# Patient Record
Sex: FEMALE | Race: BLACK OR AFRICAN AMERICAN | NOT HISPANIC OR LATINO
[De-identification: names, ages, dates, MRNs, and addresses within clinical notes are randomized per-mention and may not be internally consistent; named-entity substitution may affect disease eponyms.]

---

## 2017-02-14 ENCOUNTER — APPOINTMENT (OUTPATIENT)
Dept: ORTHOPEDIC SURGERY | Facility: CLINIC | Age: 82
End: 2017-02-14

## 2017-02-14 VITALS
HEIGHT: 61.42 IN | DIASTOLIC BLOOD PRESSURE: 70 MMHG | HEART RATE: 65 BPM | WEIGHT: 128 LBS | OXYGEN SATURATION: 90 % | SYSTOLIC BLOOD PRESSURE: 108 MMHG | BODY MASS INDEX: 23.86 KG/M2

## 2017-02-27 ENCOUNTER — OUTPATIENT (OUTPATIENT)
Dept: OUTPATIENT SERVICES | Facility: HOSPITAL | Age: 82
LOS: 1 days | End: 2017-02-27
Payer: COMMERCIAL

## 2017-02-27 PROCEDURE — 96401 CHEMO ANTI-NEOPL SQ/IM: CPT

## 2017-02-27 RX ORDER — DENOSUMAB 60 MG/ML
60 INJECTION SUBCUTANEOUS ONCE
Qty: 0 | Refills: 0 | Status: DISCONTINUED | OUTPATIENT
Start: 2017-02-27 | End: 2017-03-14

## 2017-03-01 DIAGNOSIS — M81.0 AGE-RELATED OSTEOPOROSIS WITHOUT CURRENT PATHOLOGICAL FRACTURE: ICD-10-CM

## 2017-06-15 ENCOUNTER — APPOINTMENT (OUTPATIENT)
Dept: ORTHOPEDIC SURGERY | Facility: CLINIC | Age: 82
End: 2017-06-15

## 2017-06-15 VITALS
DIASTOLIC BLOOD PRESSURE: 60 MMHG | WEIGHT: 128 LBS | HEIGHT: 61.81 IN | BODY MASS INDEX: 23.55 KG/M2 | SYSTOLIC BLOOD PRESSURE: 100 MMHG

## 2017-08-14 ENCOUNTER — APPOINTMENT (OUTPATIENT)
Dept: INFUSION THERAPY | Facility: HOSPITAL | Age: 82
End: 2017-08-14

## 2017-10-24 ENCOUNTER — APPOINTMENT (OUTPATIENT)
Dept: ORTHOPEDIC SURGERY | Facility: CLINIC | Age: 82
End: 2017-10-24
Payer: MEDICARE

## 2017-10-24 ENCOUNTER — APPOINTMENT (OUTPATIENT)
Dept: INFUSION THERAPY | Facility: HOSPITAL | Age: 82
End: 2017-10-24

## 2017-10-24 ENCOUNTER — OUTPATIENT (OUTPATIENT)
Dept: OUTPATIENT SERVICES | Facility: HOSPITAL | Age: 82
LOS: 1 days | End: 2017-10-24
Payer: COMMERCIAL

## 2017-10-24 VITALS
SYSTOLIC BLOOD PRESSURE: 118 MMHG | BODY MASS INDEX: 24.01 KG/M2 | HEIGHT: 61.73 IN | HEART RATE: 69 BPM | WEIGHT: 130.5 LBS | DIASTOLIC BLOOD PRESSURE: 76 MMHG | OXYGEN SATURATION: 93 %

## 2017-10-24 DIAGNOSIS — M81.0 AGE-RELATED OSTEOPOROSIS WITHOUT CURRENT PATHOLOGICAL FRACTURE: ICD-10-CM

## 2017-10-24 PROCEDURE — 96401 CHEMO ANTI-NEOPL SQ/IM: CPT

## 2017-10-24 PROCEDURE — 99215 OFFICE O/P EST HI 40 MIN: CPT

## 2017-10-24 RX ORDER — DENOSUMAB 60 MG/ML
60 INJECTION SUBCUTANEOUS ONCE
Qty: 0 | Refills: 0 | Status: DISCONTINUED | OUTPATIENT
Start: 2017-10-24 | End: 2017-11-10

## 2017-12-05 ENCOUNTER — APPOINTMENT (OUTPATIENT)
Dept: GASTROENTEROLOGY | Facility: CLINIC | Age: 82
End: 2017-12-05
Payer: MEDICARE

## 2017-12-05 VITALS
TEMPERATURE: 98.1 F | WEIGHT: 128 LBS | HEART RATE: 56 BPM | RESPIRATION RATE: 14 BRPM | HEIGHT: 61.73 IN | BODY MASS INDEX: 23.55 KG/M2 | DIASTOLIC BLOOD PRESSURE: 80 MMHG | OXYGEN SATURATION: 97 % | SYSTOLIC BLOOD PRESSURE: 124 MMHG

## 2017-12-05 DIAGNOSIS — N60.02 SOLITARY CYST OF LEFT BREAST: ICD-10-CM

## 2017-12-05 DIAGNOSIS — Z86.79 PERSONAL HISTORY OF OTHER DISEASES OF THE CIRCULATORY SYSTEM: ICD-10-CM

## 2017-12-05 DIAGNOSIS — Z12.11 ENCOUNTER FOR SCREENING FOR MALIGNANT NEOPLASM OF COLON: ICD-10-CM

## 2017-12-05 DIAGNOSIS — Z86.39 PERSONAL HISTORY OF OTHER ENDOCRINE, NUTRITIONAL AND METABOLIC DISEASE: ICD-10-CM

## 2017-12-05 PROCEDURE — 99202 OFFICE O/P NEW SF 15 MIN: CPT

## 2018-02-27 LAB — HEMOCCULT STL QL IA: NEGATIVE

## 2018-04-26 ENCOUNTER — APPOINTMENT (OUTPATIENT)
Dept: ORTHOPEDIC SURGERY | Facility: CLINIC | Age: 83
End: 2018-04-26

## 2018-05-01 ENCOUNTER — APPOINTMENT (OUTPATIENT)
Dept: ORTHOPEDIC SURGERY | Facility: CLINIC | Age: 83
End: 2018-05-01
Payer: MEDICARE

## 2018-05-01 ENCOUNTER — APPOINTMENT (OUTPATIENT)
Dept: INFUSION THERAPY | Facility: HOSPITAL | Age: 83
End: 2018-05-01

## 2018-05-01 ENCOUNTER — OUTPATIENT (OUTPATIENT)
Dept: OUTPATIENT SERVICES | Facility: HOSPITAL | Age: 83
LOS: 1 days | End: 2018-05-01
Payer: COMMERCIAL

## 2018-05-01 VITALS
DIASTOLIC BLOOD PRESSURE: 60 MMHG | WEIGHT: 132 LBS | BODY MASS INDEX: 24.29 KG/M2 | HEART RATE: 71 BPM | SYSTOLIC BLOOD PRESSURE: 100 MMHG | HEIGHT: 61.81 IN

## 2018-05-01 DIAGNOSIS — M81.0 AGE-RELATED OSTEOPOROSIS WITHOUT CURRENT PATHOLOGICAL FRACTURE: ICD-10-CM

## 2018-05-01 PROCEDURE — 96372 THER/PROPH/DIAG INJ SC/IM: CPT

## 2018-05-01 PROCEDURE — 99214 OFFICE O/P EST MOD 30 MIN: CPT

## 2018-05-01 RX ORDER — DENOSUMAB 60 MG/ML
60 INJECTION SUBCUTANEOUS ONCE
Qty: 0 | Refills: 0 | Status: DISCONTINUED | OUTPATIENT
Start: 2018-05-01 | End: 2018-05-16

## 2018-05-08 ENCOUNTER — APPOINTMENT (OUTPATIENT)
Dept: ORTHOPEDIC SURGERY | Facility: CLINIC | Age: 83
End: 2018-05-08

## 2018-06-11 ENCOUNTER — FORM ENCOUNTER (OUTPATIENT)
Age: 83
End: 2018-06-11

## 2018-06-11 ENCOUNTER — OUTPATIENT (OUTPATIENT)
Dept: OUTPATIENT SERVICES | Facility: HOSPITAL | Age: 83
LOS: 1 days | End: 2018-06-11
Payer: COMMERCIAL

## 2018-06-12 PROCEDURE — 78014 THYROID IMAGING W/BLOOD FLOW: CPT

## 2018-06-12 PROCEDURE — 78014 THYROID IMAGING W/BLOOD FLOW: CPT | Mod: 26

## 2018-06-12 PROCEDURE — A9516: CPT

## 2018-06-19 ENCOUNTER — APPOINTMENT (OUTPATIENT)
Dept: ORTHOPEDIC SURGERY | Facility: CLINIC | Age: 83
End: 2018-06-19
Payer: MEDICARE

## 2018-06-19 VITALS
HEART RATE: 76 BPM | HEIGHT: 61.81 IN | BODY MASS INDEX: 23.55 KG/M2 | SYSTOLIC BLOOD PRESSURE: 100 MMHG | DIASTOLIC BLOOD PRESSURE: 60 MMHG | OXYGEN SATURATION: 98 % | WEIGHT: 128 LBS

## 2018-06-19 PROCEDURE — 99214 OFFICE O/P EST MOD 30 MIN: CPT

## 2018-06-27 ENCOUNTER — FORM ENCOUNTER (OUTPATIENT)
Age: 83
End: 2018-06-27

## 2018-06-28 ENCOUNTER — RESULT REVIEW (OUTPATIENT)
Age: 83
End: 2018-06-28

## 2018-06-28 ENCOUNTER — APPOINTMENT (OUTPATIENT)
Dept: ULTRASOUND IMAGING | Facility: HOSPITAL | Age: 83
End: 2018-06-28
Payer: MEDICARE

## 2018-06-28 ENCOUNTER — OUTPATIENT (OUTPATIENT)
Dept: OUTPATIENT SERVICES | Facility: HOSPITAL | Age: 83
LOS: 1 days | End: 2018-06-28
Payer: COMMERCIAL

## 2018-06-28 PROCEDURE — 76942 ECHO GUIDE FOR BIOPSY: CPT | Mod: 26

## 2018-06-28 PROCEDURE — 88173 CYTOPATH EVAL FNA REPORT: CPT

## 2018-06-28 PROCEDURE — 10022: CPT

## 2018-06-28 PROCEDURE — 76942 ECHO GUIDE FOR BIOPSY: CPT

## 2018-06-29 LAB — NON-GYNECOLOGICAL CYTOLOGY STUDY: SIGNIFICANT CHANGE UP

## 2018-08-14 ENCOUNTER — APPOINTMENT (OUTPATIENT)
Dept: ORTHOPEDIC SURGERY | Facility: CLINIC | Age: 83
End: 2018-08-14
Payer: MEDICARE

## 2018-08-14 VITALS
BODY MASS INDEX: 23.37 KG/M2 | OXYGEN SATURATION: 85 % | HEIGHT: 61.81 IN | WEIGHT: 127 LBS | HEART RATE: 188 BPM | DIASTOLIC BLOOD PRESSURE: 60 MMHG | SYSTOLIC BLOOD PRESSURE: 100 MMHG

## 2018-08-14 PROCEDURE — 99214 OFFICE O/P EST MOD 30 MIN: CPT

## 2018-10-23 ENCOUNTER — APPOINTMENT (OUTPATIENT)
Dept: INFUSION THERAPY | Facility: HOSPITAL | Age: 83
End: 2018-10-23

## 2018-11-04 ENCOUNTER — FORM ENCOUNTER (OUTPATIENT)
Age: 83
End: 2018-11-04

## 2018-11-05 ENCOUNTER — OUTPATIENT (OUTPATIENT)
Dept: OUTPATIENT SERVICES | Facility: HOSPITAL | Age: 83
LOS: 1 days | End: 2018-11-05
Payer: COMMERCIAL

## 2018-11-05 ENCOUNTER — APPOINTMENT (OUTPATIENT)
Dept: ULTRASOUND IMAGING | Facility: HOSPITAL | Age: 83
End: 2018-11-05
Payer: MEDICARE

## 2018-11-05 PROCEDURE — 76536 US EXAM OF HEAD AND NECK: CPT | Mod: 26

## 2018-11-05 PROCEDURE — 76536 US EXAM OF HEAD AND NECK: CPT

## 2018-12-04 ENCOUNTER — OUTPATIENT (OUTPATIENT)
Dept: OUTPATIENT SERVICES | Facility: HOSPITAL | Age: 83
LOS: 1 days | End: 2018-12-04
Payer: COMMERCIAL

## 2018-12-04 ENCOUNTER — APPOINTMENT (OUTPATIENT)
Dept: ORTHOPEDIC SURGERY | Facility: CLINIC | Age: 83
End: 2018-12-04
Payer: MEDICARE

## 2018-12-04 ENCOUNTER — APPOINTMENT (OUTPATIENT)
Dept: INFUSION THERAPY | Facility: HOSPITAL | Age: 83
End: 2018-12-04

## 2018-12-04 VITALS
SYSTOLIC BLOOD PRESSURE: 135 MMHG | RESPIRATION RATE: 16 BRPM | DIASTOLIC BLOOD PRESSURE: 65 MMHG | TEMPERATURE: 96 F | HEART RATE: 77 BPM | OXYGEN SATURATION: 100 %

## 2018-12-04 VITALS
OXYGEN SATURATION: 96 % | HEIGHT: 62.2 IN | HEART RATE: 48 BPM | DIASTOLIC BLOOD PRESSURE: 70 MMHG | WEIGHT: 128 LBS | BODY MASS INDEX: 23.26 KG/M2 | SYSTOLIC BLOOD PRESSURE: 100 MMHG

## 2018-12-04 DIAGNOSIS — M81.0 AGE-RELATED OSTEOPOROSIS WITHOUT CURRENT PATHOLOGICAL FRACTURE: ICD-10-CM

## 2018-12-04 PROCEDURE — 99214 OFFICE O/P EST MOD 30 MIN: CPT

## 2018-12-04 PROCEDURE — 96401 CHEMO ANTI-NEOPL SQ/IM: CPT

## 2018-12-04 RX ORDER — DENOSUMAB 60 MG/ML
60 INJECTION SUBCUTANEOUS ONCE
Qty: 0 | Refills: 0 | Status: COMPLETED | OUTPATIENT
Start: 2018-12-04 | End: 2018-12-04

## 2018-12-04 RX ADMIN — DENOSUMAB 60 MILLIGRAM(S): 60 INJECTION SUBCUTANEOUS at 12:01

## 2019-06-04 ENCOUNTER — APPOINTMENT (OUTPATIENT)
Dept: INFUSION THERAPY | Facility: HOSPITAL | Age: 84
End: 2019-06-04

## 2019-11-14 ENCOUNTER — FORM ENCOUNTER (OUTPATIENT)
Age: 84
End: 2019-11-14

## 2019-11-15 ENCOUNTER — APPOINTMENT (OUTPATIENT)
Dept: ULTRASOUND IMAGING | Facility: HOSPITAL | Age: 84
End: 2019-11-15
Payer: MEDICARE

## 2019-11-15 ENCOUNTER — OUTPATIENT (OUTPATIENT)
Dept: OUTPATIENT SERVICES | Facility: HOSPITAL | Age: 84
LOS: 1 days | End: 2019-11-15
Payer: MEDICARE

## 2019-11-15 ENCOUNTER — APPOINTMENT (OUTPATIENT)
Dept: RADIOLOGY | Facility: HOSPITAL | Age: 84
End: 2019-11-15
Payer: MEDICARE

## 2019-11-15 PROCEDURE — 76536 US EXAM OF HEAD AND NECK: CPT | Mod: 26

## 2019-11-15 PROCEDURE — 77080 DXA BONE DENSITY AXIAL: CPT

## 2019-11-15 PROCEDURE — 77080 DXA BONE DENSITY AXIAL: CPT | Mod: 26

## 2019-11-15 PROCEDURE — 76536 US EXAM OF HEAD AND NECK: CPT

## 2019-11-26 ENCOUNTER — APPOINTMENT (OUTPATIENT)
Dept: ORTHOPEDIC SURGERY | Facility: CLINIC | Age: 84
End: 2019-11-26
Payer: MEDICARE

## 2019-11-26 DIAGNOSIS — E04.1 NONTOXIC SINGLE THYROID NODULE: ICD-10-CM

## 2019-11-26 PROCEDURE — 99215 OFFICE O/P EST HI 40 MIN: CPT

## 2019-11-26 NOTE — HISTORY OF PRESENT ILLNESS
[FreeTextEntry1] : This is a 1 year f/u visit for this 85 year old black woman, a retired nurse's assistant, referred by her primary care doctor for her "bones"  back in 2015. I follow her for osteoporosis and for hyperthyroidism. She has toxic MNG, and a left thyroid nodule (cold) 2.4 cm that was biopsied 18 and found benign (see below).\par \par Recent thyroid sonogram had shown nodules and the goal was to distinguish between a toxic autonomous nodule and Graves. See results, below.\par \par Key med hx:\par 1,  There is no history of fracture. \par 2. Patient thinks her tallest height was 5'4" and she has lost 2 inches.\par 3.  There is no parental history of hip fracture. \par 4. The patient is treated for hypertension, atrial fibrillation, and states she has been treated for osteoporosis. \par 5. pt. states she used to take Boniva and Fosamax. She was started on Boniva, she thinks she took this for a few years. She was then switched to Fosamax which she took for a few years.\par 6. It has been a while since patient took oral bisphosphonates.\par 7. Patient got one dose of Reclast in 2013. \par \par Overactive thyroid:\par Patient has been on methimazole, most recently 2.5 mg /day\par The nature of thyroid disease has been unclear\par \par Today, thyroid sonogram done 11/15/19 was rev and compared with: 18 and sonogram 11/15/17: (compared with: 14)\par Report states that size and nodules, particularly the 2.2 cm left nodule are stable.\par right lobe: 4.8 x 1.8 x 1.5 cm\par Left lobe: 4.9 x 2.1 x 2.3 cm\par right lobe has 2 nodules larger than 1 cm:  1.1 x 0.8 x 1 cm that had been smaller 3.5 years ago; and 1 x 0.6 x 0.9 cm that had been smaller as well.\par left lobe has one dominant nodule 2.4 x 2 x 1.9 cm ; it had been 2.1  x 1.8 x 1.9 cm 3.5 years ago \par \par Nuclear scan done 18 of JACKSON scan and uptake:\par uptake at 24 hours: 51.8%\par while there was greater uptake on the left side of thyroid (where nodule is) than right, there was uptake on the right side as well. Hence, the left sided nodule is not a TAN and needed a biopsy.\par \par results of FNA  done 18 :\par FNA of L nodule, 2.4 cm: benign goiter with Hurthle cell metaplasia\par Prior lab evaluation done:\par 18 showed negative anti thyroglobulin and negative anti peroxidase antibodies\par 18: negative thyroid stimulating immunoglobulins\par Graves, or autoimmune thyroid disease is less likely.\par Patient has been well controlled with regard to thyroid, on methimazole 2.5 mg/day.\par \par With regard to osteoporosis management, the patient  got her first injection of prolia in Sept 15, 2016 and tolerated it well.\par \par Prolia #1: 2016\par Prolia #2: 2017\par Prolia #3: 10/24/17\par Prolia #4:  (18)\par Prolia #5 Dec 2018\par skipped spring dose of spring 2019\par \par Today, DXA done 11/15/19 was rev and compared with: DXA 11/15/17 Ronald. 2015: this is on one year of Prolia\par T-scores: -3.6, -2.5, -1.6 and -2.3 at the LS, L FN, L TH and L 1/3 radius\par T-scores: -3.9, -2.1, -1.4 at the L1-L3, L FN, and L TH, improved at LS, FN and TH\par T-scores of -4.7, -2.4, -1.7 and -2.4 at the spine, L FN, L TH and L 1/3 radius respectively.\par \par Today, labs done 19 were rev and compared with: 18 , 18 (on methimazole 2.5 mg/day) compared with: 10/14/17 , 17,  17 (dose of 2.5 mg/day) and 16 on methimazole 5 mg:\par TSH: 1.97, prior: 0.97, 2.24, 0.04, 0.04, 1.05 (2.5 mg/day) , prior  3.94 (5 mg/day), 2.97, prior (on 10 mg methimazole) 4.13 (0.27-4.20)\par Free T4: 1.2,  1.3, 1.2, 1.3, 1.6, prior 1.2, prior 1.3, prior 1.2, prior 1.1\par Free T3:   not done, 3.57 (1.8-4.6), prior  2.96, prior 3.28 (1.8-4.6)\par 25 D: 38.8, 43.6, 41.9, 44.6, 52.8,  58.2\par BSAP: 7.0, 5.5, 6.3, 7.0 (2017), 6.0, prior 7.4\par CTX: 376,  186,  59, 246 (2017)\par creat: 1.16\par PTH/ca: 81/9.9\par \par other labs: \par glucose: 75, 99\par creat: 1.25, 1.42\par PTH/ca: 78/9.9; alb: 4.6;   80/10.0\par anti thyroglobulin: neg\par anti peroxidase: neg\par \par 18\par thyroid stimulating immunoglobulin is negative\par \par calcium intake:  one caltrate a day, has been taking 3/day will reduce to 1 pill BID\par vitamin D: 2000 IU/day\par \par Medications:\par methimazole 5 mg tablet,  5 mg/day\par other meds:simvastatin 40 mg; lisinopril 40 mg; metoprolol; \par \par calcium:  takes one pill BID\par vitamin D: takes one BID\par \par Mother:  68, CHF\par Father: , older,  of old age

## 2019-11-26 NOTE — ASSESSMENT
[FreeTextEntry1] : 84 year old black woman who has history of hyperthyroidism and with recent workup of increased uptake on JACKSON scan (51.8%) but diffuse uptake that does not localize to the 2.4 cm nodule on the left, and with negative thyroid antibodies, pt's hyperthyroidism is likely on the basis of toxic MNG. \par Because nodule is not a "hot nodule" FNA was done of nodule and it is benign. This can be followed for change in size with sonogram in 6 months.\par The patient has been well controlled with normal TFT's on very low dose methimazole 2.5 mg daily and now TFTs appear well controlled. Of note, pt feels fine.\par With regard to severe osteoporosis at the lumbar spine and osteopenia of hip and radius, there is past Rx with oral bisphosphonates and one dose of IV Reclast in December 2013. Updated DXA after 2.5 years  of Prolia does show some improvement. Patient received her 5th dose of prolia Dec 2018 and skipped the Spring of 2019; bone markers are still in the low end of normal, but she can now received Prolia #6. \par \par Plan:\par 1. continue methimazole 2.5 mg/day\par 2. Prolia dose #6 to be ordered; labs in spring of 2020 , visit thereafter\par 3. f/u thyroid sonogram  in November 2020\par

## 2019-11-26 NOTE — REASON FOR VISIT
[Follow-Up: _____] : a [unfilled] follow-up visit [FreeTextEntry1] :  hyperthyroidism and thyroid nodule; osteoporosis

## 2019-12-06 ENCOUNTER — APPOINTMENT (OUTPATIENT)
Age: 84
End: 2019-12-06

## 2019-12-20 ENCOUNTER — OUTPATIENT (OUTPATIENT)
Dept: OUTPATIENT SERVICES | Facility: HOSPITAL | Age: 84
LOS: 1 days | End: 2019-12-20
Payer: MEDICARE

## 2019-12-20 ENCOUNTER — APPOINTMENT (OUTPATIENT)
Age: 84
End: 2019-12-20

## 2019-12-20 VITALS
TEMPERATURE: 99 F | OXYGEN SATURATION: 96 % | SYSTOLIC BLOOD PRESSURE: 133 MMHG | DIASTOLIC BLOOD PRESSURE: 79 MMHG | RESPIRATION RATE: 18 BRPM | HEART RATE: 80 BPM

## 2019-12-20 DIAGNOSIS — M81.0 AGE-RELATED OSTEOPOROSIS WITHOUT CURRENT PATHOLOGICAL FRACTURE: ICD-10-CM

## 2019-12-20 PROCEDURE — 96372 THER/PROPH/DIAG INJ SC/IM: CPT

## 2019-12-20 RX ORDER — DENOSUMAB 60 MG/ML
60 INJECTION SUBCUTANEOUS ONCE
Refills: 0 | Status: COMPLETED | OUTPATIENT
Start: 2019-12-20 | End: 2019-12-20

## 2019-12-20 RX ADMIN — DENOSUMAB 60 MILLIGRAM(S): 60 INJECTION SUBCUTANEOUS at 14:25

## 2020-06-19 ENCOUNTER — APPOINTMENT (OUTPATIENT)
Dept: ENDOCRINOLOGY | Facility: CLINIC | Age: 85
End: 2020-06-19
Payer: MEDICARE

## 2020-06-19 VITALS
SYSTOLIC BLOOD PRESSURE: 110 MMHG | HEIGHT: 63 IN | BODY MASS INDEX: 21.97 KG/M2 | HEART RATE: 70 BPM | WEIGHT: 124 LBS | DIASTOLIC BLOOD PRESSURE: 64 MMHG

## 2020-06-19 PROCEDURE — 99215 OFFICE O/P EST HI 40 MIN: CPT | Mod: 25

## 2020-06-19 PROCEDURE — 36415 COLL VENOUS BLD VENIPUNCTURE: CPT

## 2020-06-19 RX ORDER — DENOSUMAB 60 MG/ML
60 INJECTION SUBCUTANEOUS
Qty: 1 | Refills: 1 | Status: DISCONTINUED | COMMUNITY
Start: 2017-02-21 | End: 2020-06-19

## 2020-06-26 LAB
25(OH)D3 SERPL-MCNC: 42.3 NG/ML
ALBUMIN SERPL ELPH-MCNC: 4.9 G/DL
ALP BLD-CCNC: 50 U/L
ALP BONE SERPL-MCNC: 6.1 MCG/L
ALT SERPL-CCNC: 8 U/L
ANION GAP SERPL CALC-SCNC: 17 MMOL/L
AST SERPL-CCNC: 20 U/L
BILIRUB SERPL-MCNC: 0.5 MG/DL
BUN SERPL-MCNC: 29 MG/DL
CALCIUM SERPL-MCNC: 10.3 MG/DL
CALCIUM SERPL-MCNC: 10.3 MG/DL
CHLORIDE SERPL-SCNC: 102 MMOL/L
CO2 SERPL-SCNC: 22 MMOL/L
CREAT SERPL-MCNC: 1.56 MG/DL
GLUCOSE SERPL-MCNC: 87 MG/DL
MAGNESIUM SERPL-MCNC: 2.1 MG/DL
PARATHYROID HORMONE INTACT: 71 PG/ML
PHOSPHATE SERPL-MCNC: 4.2 MG/DL
POTASSIUM SERPL-SCNC: 5 MMOL/L
PROT SERPL-MCNC: 7.2 G/DL
SODIUM SERPL-SCNC: 141 MMOL/L
T3 SERPL-MCNC: 141 NG/DL
T4 FREE SERPL-MCNC: 1.1 NG/DL
T4 SERPL-MCNC: 8.7 UG/DL
TSH RECEPTOR AB: <1.1 IU/L
TSH SERPL-ACNC: 1.51 UIU/ML
TSI ACT/NOR SER: <0.1 IU/L

## 2020-06-26 NOTE — HISTORY OF PRESENT ILLNESS
[FreeTextEntry1] : Ms. Cruz is an 85 year-old woman with a history of multiple medical problems including osteoporosis, atrial fibrillation, hyperthyroidism, thyroid nodules presenting to establish care with me. She is a former patient of Dr. Neha Ross, last seen November 2019.\par \par Bone History\par Osteoporosis diagnosed many years ago on routine bone density (no report available); most recent bone density as below\par Fracture history: None\par Family history: No parental history of hip fracture\par Treatment: \par Remote history of alendronate and ibandronate\par Zoledronic acid 5 mg IV in December 2013\par Denosumab 60 mg SC in September 2016, February 2017, October 2017, December 2018 (spring dose skipped), December 2019 (spring dose skipped)\par \par Falls: No\par Height loss: About 2 inches\par Kidney stones: No\par Dental health: Full dentures\par Exercise: Walks daily\par Dairy intake: Rare\par Calcium supplements: None\par Multivitamin: None\par Vitamin D supplements: 1000 intl units most days\par \par Osteoporosis risk factors include: Postmenopausal status,  race, prior fracture, falls, height loss, small thin bones, tobacco use, excessive alcohol, anorexia, family history, vitamin D deficiency, corticosteroid use, seizure medications, malabsorption, hyperparathyroidism, hyperthyroidism.\par NEGATIVE EXCEPT: Postmenopausal status, hyperthyroidism\par \par Hyperthyroidism. Thyroid nodules. \par She was diagnosed with hyperthyroidism many years ago. She has been on methimazole since at least 2016, most recently 2.5 mg daily since 2017.\par She has bilateral thyroid nodules, with a left 2.4 cm dominant nodule. \par I-123 thyroid uptake and scan in June 2018 demonstrated diffuse uptake at 51.8% not localizing to the left 2.4 cm nodule, however, antibodies negative per notes. \par Biopsy of the left 2.6 cm nodule benign ([place] II) in July 2018.\par No history of radiation exposure.\par No family history of thyroid disease.\par \par No dysphagia, fixed/hard neck mass, orthopnea. No change in weight, palpitations, diarrhea/constipation, depression/anxiety.

## 2020-06-26 NOTE — DATA REVIEWED
[FreeTextEntry1] : Thyroid ultrasound (November 15, 2019) reviewed and significant for:\par RIGHT LOBE:\par Dimensions: 4.3 x 1.8 x 1.5 cm (sagittal x AP x transverse)\par Echotexture: homogeneous\par Vascularity: normovascular\par The right lobe contains a few nodules with largest nodule described below. Subcentimeter nodules include an upper pole 0.7 cm isoechoic solid nodule and a lower pole 0.7 cm isoechoic nodule with a complete ring of eccentric calcifications.\par \par Nodule 1:\par Location: Interpolar \par Dimensions: 1.3 x 0.7 x 1.0 cm (sagittal x AP x transverse), stable in size from prior sonography from 11/5/2018.\par Composition: Solid with cystic components.\par For solid nodules or for the solid portion of a partially cystic nodule, does the solid portion have any of the following suspicious features?\par -  No: Hypoechoic\par -  No: Microcalcifications\par -  No: Irregular margin (infiltrative or microlobulated)\par -  No: Taller than wide (AP dimension > transverse)\par -  No: Extrathyroidal extension\par -  No: Interrupted rim calcification with soft tissue extrusion\par \par LEFT LOBE:\par Dimensions: 4.4 x 1.8 x 2.1 cm (sagittal x AP x transverse)\par Echotexture: homogeneous\par Vascularity: normovascular\par The left lobe contains a few nodules, with the largest nodule described below. Subcentimeter nodules include upper pole 0.8 cm spongiform colloid cyst and a new 0.8 cm spongiform colloid cyst in the interpolar region.\par \par Nodule 1:\par Location: Interpolar/lower pole \par Dimensions: 2.3 x 1.9 x 1.9 cm (sagittal x AP x transverse), stable in size since 11/5/2018.\par Composition: Solid with cystic components\par For solid nodules or for the solid portion of a partially cystic nodule, does the solid portion have any of the following suspicious features?\par -  No: Hypoechoic\par -  No: Microcalcifications\par -  No: Irregular margin (infiltrative or microlobulated)\par -  No: Taller than wide (AP dimension > transverse)\par -  No: Extrathyroidal extension\par -  No: Interrupted rim calcification with soft tissue extrusion\par \par ISTHMUS:\par Dimensions: 0.2 cm AP\par The isthmus lobe contains no visible nodules.\par \par CERVICAL LYMPH NODE EVALUATION (for any abnormal lymph node, please note the following: location, size, calcification, cystic area, absence of central hilum, round shape, abnormal blood flow): \par -  No abnormal lymph nodes are identified in the neck.\par \par PARATHYROID EXAMINATION:\par -  Parathyroid glands not visualized.\par \par IMPRESSION: Multinodular goiter.\par Since 11/15/2019, there are stable appearance of the 1.3 cm right interpolar and 2.3 cm left interpolar/lower pole nodules. There are subcentimeter nodules are seen in the thyroid lobes bilaterally that do not meet criteria for FNA, however 12 month follow-up is recommended to determine interval changes.\par

## 2020-06-26 NOTE — RESULTS/DATA
[Hologic] : hologic [L1 - L4] : L1 - L4 [BMD ___ g/cm2] : BMD: [unfilled] g/cm2 [T-Score ___] : T-score: [unfilled] [FreeTextEntry2] : November 15, 2019 [de-identified] : previous T-score -3.9 in 2017 at outside facility

## 2020-06-26 NOTE — PHYSICAL EXAM
[Alert] : alert [No Acute Distress] : no acute distress [Healthy Appearance] : healthy appearance [Normal Sclera/Conjunctiva] : normal sclera/conjunctiva [No Neck Mass] : no neck mass was observed [No LAD] : no lymphadenopathy [Thyroid Not Enlarged] : the thyroid was not enlarged [Supple] : the neck was supple [No Respiratory Distress] : no respiratory distress [Normal S1, S2] : normal S1 and S2 [Clear to Auscultation] : lungs were clear to auscultation bilaterally [Normal Rate] : heart rate was normal [Regular Rhythm] : with a regular rhythm [No Spinal Tenderness] : no spinal tenderness [No Stigmata of Cushings Syndrome] : no stigmata of Cushings Syndrome [Normal Gait] : normal gait [Normal Insight/Judgement] : insight and judgment were intact [Kyphosis] : no kyphosis present [Scoliosis] : no scoliosis [Acanthosis Nigricans] : no acanthosis nigricans [de-identified] : no moon facies, no supraclavicular fat pads [de-identified] : approximately 2 cm left pole nodule, rubbery

## 2020-06-26 NOTE — ASSESSMENT
[FreeTextEntry1] : Osteoporosis. She has no history of fragility fracture. She has a remote history of alendronate and ibandronate use, zoledronic acid in 2013, and most recently denosumab since September 2016 with two skipped doses. Her last bone density, while not directly comparable to previous, showed an improvement in T-score at the lumbar spine. We discussed the potential benefits and risks of antiresorptive osteoporosis therapy at length, including but not limited to osteonecrosis of the jaw and atypical femoral fracture. She is tolerating denosumab and we will continue. We discussed that denosumab must be dosed every 6 months due to rebound increase in bone breakdown with abrupt discontinuation of therapy, with transition to bisphosphonate therapy prior to a "drug holiday."\par Check calciotropic panel\par Continue denosumab 60 mg SC every 6 months; next dose due\par Calcium 1200 mg daily from diet and supplements (to be taken in divided doses as no more than 500-600 mg can be absorbed at one time); advised supplemental calcium\par Continue current vitamin D regimen pending level\par Diet, exercise and fall prevention discussed\par \par Hyperthyroidism. Thyroid nodules. 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 She has been clinically and biochemically euthyroid on a low dose of methimazole. We discussed that approximately 95 percent of all thyroid nodules are caused by benign conditions. We discussed that thyroid nodules are very common and less likely to be cancer in older individuals. We discussed the risks and benefits of evaluation for thyroid cancer and morbidity of treatment if diagnosed versus overall prognosis of thyroid cancer. We will readdress ultrasound monitoring next visit.\par Continue methimazole 2.5 mg daily pending thyroid function tests today\par Check thyroid stimulating immunoglobulin and TSH receptor antibodies\par \par Return to see me in 6 months or earlier as needed. \par \par I reviewed the DXA performed on November 15, 2019 with the patient today.\par I reviewed the thyroid ultrasound performed on November 15, 2019 with the patient today. \par I counseled the patient regarding calcium and vitamin D intake today.\par I discussed the following osteoporosis therapies: Denosumab\par \par CC:\par Dr. Debbi Montgomery, Fax [phone]

## 2020-06-26 NOTE — ADDENDUM
[FreeTextEntry1] : Recent test results as below; discussed with Ms. Cruz. Renal function lower than previous and recommended she keep hydrated and follow-up with primary care. Thyroid function within range and recommend same dose of methimazole. Thyroid stimulating immunoglobulin and TSH receptor antibody negative; hyperthyroidism most likely due to toxic nodule. PTH borderline elevated, likely due to chronic kidney disease. Other test results within range. 6/26/20

## 2020-07-24 RX ORDER — DENOSUMAB 60 MG/ML
60 INJECTION SUBCUTANEOUS
Qty: 1 | Refills: 0 | Status: COMPLETED | OUTPATIENT
Start: 2020-07-24 | End: 1900-01-01

## 2020-07-31 ENCOUNTER — APPOINTMENT (OUTPATIENT)
Dept: RHEUMATOLOGY | Facility: CLINIC | Age: 85
End: 2020-07-31
Payer: MEDICARE

## 2020-07-31 VITALS
HEIGHT: 63 IN | SYSTOLIC BLOOD PRESSURE: 156 MMHG | BODY MASS INDEX: 21.97 KG/M2 | HEART RATE: 80 BPM | DIASTOLIC BLOOD PRESSURE: 73 MMHG | OXYGEN SATURATION: 97 % | RESPIRATION RATE: 15 BRPM | TEMPERATURE: 97.7 F | WEIGHT: 124 LBS

## 2020-07-31 PROCEDURE — 96401 CHEMO ANTI-NEOPL SQ/IM: CPT

## 2020-07-31 RX ORDER — DENOSUMAB 60 MG/ML
60 INJECTION SUBCUTANEOUS
Qty: 1 | Refills: 0 | Status: COMPLETED | OUTPATIENT
Start: 2020-07-31 | End: 1900-01-01

## 2020-07-31 RX ORDER — DENOSUMAB 60 MG/ML
60 INJECTION SUBCUTANEOUS
Qty: 1 | Refills: 0 | Status: COMPLETED | OUTPATIENT
Start: 2020-07-31

## 2020-07-31 RX ADMIN — DENOSUMAB 0 MG/ML: 60 INJECTION SUBCUTANEOUS at 00:00

## 2020-12-16 ENCOUNTER — APPOINTMENT (OUTPATIENT)
Dept: ENDOCRINOLOGY | Facility: CLINIC | Age: 85
End: 2020-12-16
Payer: MEDICARE

## 2020-12-16 VITALS
HEART RATE: 118 BPM | WEIGHT: 128 LBS | HEIGHT: 63 IN | BODY MASS INDEX: 22.68 KG/M2 | DIASTOLIC BLOOD PRESSURE: 83 MMHG | SYSTOLIC BLOOD PRESSURE: 133 MMHG

## 2020-12-16 PROCEDURE — 99214 OFFICE O/P EST MOD 30 MIN: CPT

## 2020-12-16 PROCEDURE — 99072 ADDL SUPL MATRL&STAF TM PHE: CPT

## 2020-12-16 NOTE — ASSESSMENT
[FreeTextEntry1] : Osteoporosis. She has no history of fragility fracture. She has a remote history of alendronate and ibandronate use, zoledronic acid in 2013, and most recently denosumab since September 2016 with two skipped doses. Her last bone density, while not directly comparable to previous, showed an improvement in T-score at the lumbar spine. We discussed the potential benefits and risks of antiresorptive osteoporosis therapy at length, including but not limited to osteonecrosis of the jaw and atypical femoral fracture. She is tolerating denosumab and we will continue. We discussed that denosumab must be dosed every 6 months due to rebound increase in bone breakdown with abrupt discontinuation of therapy, with transition to bisphosphonate therapy prior to a "drug holiday."\par Continue denosumab 60 mg SC every 6 months; next dose due in January 2021\par Calcium 1200 mg daily from diet and supplements (to be taken in divided doses as no more than 500-600 mg can be absorbed at one time); advised supplemental calcium\par Continue current vitamin D regimen pending level\par Diet, exercise and fall prevention discussed\par \par Hyperthyroidism. Thyroid nodules. 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 TSH receptor antibody and thyroid stimulating immunoglobulin are negative. She has been clinically and biochemically euthyroid on a low dose of methimazole. We discussed that approximately 95 percent of all thyroid nodules are caused by benign conditions. We discussed that thyroid nodules are very common and less likely to be cancer in older individuals. We discussed the risks and benefits of evaluation for thyroid cancer and morbidity of treatment if diagnosed versus overall prognosis of thyroid cancer. We will defer further ultrasound surveillance. \par Continue methimazole 2.5 mg daily pending thyroid function tests\par \par Return to see me in 6 months or earlier as needed. \par \par I reviewed the DXA performed on November 15, 2019 with the patient today.\par I reviewed the thyroid ultrasound performed on June 20, 2020 with the patient today. \par I counseled the patient regarding calcium and vitamin D intake today.\par I discussed the following osteoporosis therapies: Denosumab\par \par CC:\par Dr. Debbi Montgomery, Fax [phone]

## 2020-12-16 NOTE — PHYSICAL EXAM
[Alert] : alert [Healthy Appearance] : healthy appearance [No Acute Distress] : no acute distress [Normal Sclera/Conjunctiva] : normal sclera/conjunctiva [No Neck Mass] : no neck mass was observed [No LAD] : no lymphadenopathy [Supple] : the neck was supple [Thyroid Not Enlarged] : the thyroid was not enlarged [No Respiratory Distress] : no respiratory distress [Clear to Auscultation] : lungs were clear to auscultation bilaterally [Normal S1, S2] : normal S1 and S2 [Normal Rate] : heart rate was normal [Regular Rhythm] : with a regular rhythm [No Spinal Tenderness] : no spinal tenderness [No Stigmata of Cushings Syndrome] : no stigmata of Cushings Syndrome [Normal Gait] : normal gait [Normal Insight/Judgement] : insight and judgment were intact [Kyphosis] : no kyphosis present [Scoliosis] : no scoliosis [Acanthosis Nigricans] : no acanthosis nigricans [de-identified] : approximately 2 cm left pole nodule, rubbery [de-identified] : no moon facies, no supraclavicular fat pads

## 2020-12-16 NOTE — HISTORY OF PRESENT ILLNESS
[FreeTextEntry1] : Ms. Cruz is an 86 year-old woman with a history of multiple medical problems including osteoporosis, atrial fibrillation, hyperthyroidism, thyroid nodules presenting for follow-up of her endocrine issues. I saw her for an initial visit in June 2020; she is a former patient of Dr. Neha Ross.\par \par Bone History\par Osteoporosis diagnosed many years ago on routine bone density (no report available); most recent bone density as below\par Fracture history: None\par Family history: No parental history of hip fracture\par Treatment: \par Remote history of alendronate and ibandronate\par Zoledronic acid 5 mg IV in December 2013\par Denosumab 60 mg SC in September 2016, February 2017, October 2017, December 2018 (spring dose skipped), December 2019 (spring dose skipped), July 2020\par \par Falls: No\par Height loss: About 2 inches\par Kidney stones: No\par Dental health: Full dentures\par Exercise: Walks daily\par Dairy intake: Rare\par Calcium supplements: 500 mg daily\par Multivitamin: None\par Vitamin D supplements: 1000 intl units most days\par \par Osteoporosis risk factors include: Postmenopausal status,  race, prior fracture, falls, height loss, small thin bones, tobacco use, excessive alcohol, anorexia, family history, vitamin D deficiency, corticosteroid use, seizure medications, malabsorption, hyperparathyroidism, hyperthyroidism.\par NEGATIVE EXCEPT: Postmenopausal status, hyperthyroidism\par \par Hyperthyroidism. Thyroid nodules. \par She was diagnosed with hyperthyroidism many years ago. She has been on methimazole since at least 2016, most recently 2.5 mg daily since 2017.\par She has bilateral thyroid nodules, with a left 2.4 cm dominant nodule. \par I-123 thyroid uptake and scan in June 2018 demonstrated diffuse uptake at 51.8% not localizing to the left 2.4 cm nodule, however, TSH receptor antibody and thyroid stimulating immunoglobulin negative. \par Biopsy of the left 2.6 cm nodule benign ([place]) in July 2018.\par No history of radiation exposure.\par No family history of thyroid disease.\par \par Interim History \par Laboratory results from last visit as below. Renal function lower than previous and recommended she keep hydrated and follow-up with primary care. Thyroid function within range and recommend same dose of methimazole. Thyroid stimulating immunoglobulin and TSH receptor antibody negative; hyperthyroidism most likely due to toxic nodule. PTH borderline elevated, likely due to chronic kidney disease. Other test results within range. \par No dysphagia, fixed/hard neck mass, orthopnea. No change in weight, palpitations, diarrhea/constipation, depression/anxiety. \par Medical and surgical history, medications, allergies, social and family history reviewed and updated as needed.

## 2020-12-16 NOTE — RESULTS/DATA
[Hologic] : hologic [L1 - L4] : L1 - L4 [BMD ___ g/cm2] : BMD: [unfilled] g/cm2 [T-Score ___] : T-score: [unfilled] [FreeTextEntry2] : November 15, 2019 [de-identified] : previous T-score -3.9 in 2017 at outside facility

## 2021-01-25 RX ADMIN — DENOSUMAB 0 MG/ML: 60 INJECTION SUBCUTANEOUS at 00:00

## 2021-01-29 ENCOUNTER — APPOINTMENT (OUTPATIENT)
Dept: RHEUMATOLOGY | Facility: CLINIC | Age: 86
End: 2021-01-29
Payer: MEDICARE

## 2021-01-29 ENCOUNTER — MED ADMIN CHARGE (OUTPATIENT)
Age: 86
End: 2021-01-29

## 2021-01-29 VITALS
OXYGEN SATURATION: 99 % | HEART RATE: 60 BPM | RESPIRATION RATE: 15 BRPM | WEIGHT: 129 LBS | HEIGHT: 63 IN | DIASTOLIC BLOOD PRESSURE: 77 MMHG | BODY MASS INDEX: 22.86 KG/M2 | SYSTOLIC BLOOD PRESSURE: 162 MMHG | TEMPERATURE: 97.7 F

## 2021-01-29 PROCEDURE — 96401 CHEMO ANTI-NEOPL SQ/IM: CPT

## 2021-01-29 PROCEDURE — 99072 ADDL SUPL MATRL&STAF TM PHE: CPT

## 2021-01-29 RX ORDER — DENOSUMAB 60 MG/ML
60 INJECTION SUBCUTANEOUS
Qty: 1 | Refills: 0 | Status: COMPLETED | OUTPATIENT
Start: 2021-01-18

## 2021-06-01 ENCOUNTER — APPOINTMENT (OUTPATIENT)
Dept: NEPHROLOGY | Facility: CLINIC | Age: 86
End: 2021-06-01
Payer: MEDICARE

## 2021-06-01 VITALS — DIASTOLIC BLOOD PRESSURE: 86 MMHG | SYSTOLIC BLOOD PRESSURE: 152 MMHG

## 2021-06-01 VITALS — DIASTOLIC BLOOD PRESSURE: 87 MMHG | SYSTOLIC BLOOD PRESSURE: 149 MMHG

## 2021-06-01 VITALS
DIASTOLIC BLOOD PRESSURE: 67 MMHG | WEIGHT: 128 LBS | SYSTOLIC BLOOD PRESSURE: 136 MMHG | HEART RATE: 79 BPM | BODY MASS INDEX: 22.67 KG/M2

## 2021-06-01 VITALS — DIASTOLIC BLOOD PRESSURE: 71 MMHG | SYSTOLIC BLOOD PRESSURE: 150 MMHG

## 2021-06-01 DIAGNOSIS — Z86.79 PERSONAL HISTORY OF OTHER DISEASES OF THE CIRCULATORY SYSTEM: ICD-10-CM

## 2021-06-01 DIAGNOSIS — I07.1 RHEUMATIC TRICUSPID INSUFFICIENCY: ICD-10-CM

## 2021-06-01 DIAGNOSIS — Z78.9 OTHER SPECIFIED HEALTH STATUS: ICD-10-CM

## 2021-06-01 PROCEDURE — 99204 OFFICE O/P NEW MOD 45 MIN: CPT

## 2021-06-01 RX ORDER — METOPROLOL SUCCINATE 100 MG/1
100 TABLET, EXTENDED RELEASE ORAL DAILY
Refills: 3 | Status: ACTIVE | COMMUNITY
Start: 2017-01-25

## 2021-06-01 RX ORDER — ATORVASTATIN CALCIUM 40 MG/1
40 TABLET, FILM COATED ORAL DAILY
Qty: 30 | Refills: 5 | Status: ACTIVE | COMMUNITY

## 2021-06-01 NOTE — ASSESSMENT
[FreeTextEntry1] : CKD 3 likely HTN nephrosclerosis --  has had some progression since 2018/2019 it appears but stable now. \par No proteinuria, benign UA\par lytes ok\par BP seems may be suboptimally controlled -- charo in light of SPRINT study - even despite age\par might consider add ca channel blocker at low dose \par advised follow home BP-- f/u with cardiology/ PCP \par will check renal sono\par advised f/u here in 3-4 mos -- will recheck labs incl CKD labs (PTH, phos) \par \par \par \par \par

## 2021-06-01 NOTE — HISTORY OF PRESENT ILLNESS
[FreeTextEntry1] : asked to see by Dr Montgomery for CKD\par pt is an 87 yo B woman hx HTN x years, osteoporosis, pulm HTN , valvular heart disease (MR and TR), CKD baseline creat 1.2 in 2018 and 2019, and 1.3 -1.4 2020. most recent labs from 5/3 creat 1.46, k 5, hco3 26, ca 9.2, uric acid 7.4, alb 4.4, hgb 10.7,  UA neg prot, cells \par reports on 1/2 pill of lisinopril /d recently per PCP-no other med changes\par takes most meds hs -- gets nocturia \par \par

## 2021-06-01 NOTE — CONSULT LETTER
[Dear  ___] : Dear  [unfilled], [Consult Letter:] : I had the pleasure of evaluating your patient, [unfilled]. [Please see my note below.] : Please see my note below. [Consult Closing:] : Thank you very much for allowing me to participate in the care of this patient.  If you have any questions, please do not hesitate to contact me. [Sincerely,] : Sincerely, [FreeTextEntry3] : George Bustamante MD, YVETTE\par Division of Nephrology\par [facility]\par  of Medicine\par [employer] \par \par \par \par \par

## 2021-06-16 ENCOUNTER — APPOINTMENT (OUTPATIENT)
Dept: ENDOCRINOLOGY | Facility: CLINIC | Age: 86
End: 2021-06-16
Payer: MEDICARE

## 2021-06-16 VITALS
SYSTOLIC BLOOD PRESSURE: 111 MMHG | DIASTOLIC BLOOD PRESSURE: 66 MMHG | BODY MASS INDEX: 21.79 KG/M2 | HEART RATE: 89 BPM | WEIGHT: 123 LBS

## 2021-06-16 PROCEDURE — 99214 OFFICE O/P EST MOD 30 MIN: CPT

## 2021-07-16 ENCOUNTER — RESULT REVIEW (OUTPATIENT)
Age: 86
End: 2021-07-16

## 2021-07-16 ENCOUNTER — APPOINTMENT (OUTPATIENT)
Dept: ULTRASOUND IMAGING | Facility: CLINIC | Age: 86
End: 2021-07-16
Payer: MEDICARE

## 2021-07-16 PROCEDURE — 76770 US EXAM ABDO BACK WALL COMP: CPT | Mod: 26

## 2021-07-25 RX ADMIN — DENOSUMAB 0 MG/ML: 60 INJECTION SUBCUTANEOUS at 00:00

## 2021-07-30 ENCOUNTER — APPOINTMENT (OUTPATIENT)
Dept: RADIOLOGY | Facility: CLINIC | Age: 86
End: 2021-07-30

## 2021-09-01 ENCOUNTER — LABORATORY RESULT (OUTPATIENT)
Age: 86
End: 2021-09-01

## 2021-09-03 LAB
25(OH)D3 SERPL-MCNC: 45.6 NG/ML
ALBUMIN SERPL ELPH-MCNC: 4.6 G/DL
ALP BLD-CCNC: 56 U/L
ALT SERPL-CCNC: 8 U/L
ANION GAP SERPL CALC-SCNC: 12 MMOL/L
APPEARANCE: CLEAR
AST SERPL-CCNC: 17 U/L
BACTERIA: NEGATIVE
BASOPHILS # BLD AUTO: 0.01 K/UL
BASOPHILS NFR BLD AUTO: 0.2 %
BILIRUB SERPL-MCNC: 0.4 MG/DL
BILIRUBIN URINE: NEGATIVE
BLOOD URINE: NEGATIVE
BUN SERPL-MCNC: 43 MG/DL
CALCIUM SERPL-MCNC: 10.3 MG/DL
CALCIUM SERPL-MCNC: 10.3 MG/DL
CHLORIDE SERPL-SCNC: 107 MMOL/L
CO2 SERPL-SCNC: 24 MMOL/L
COLOR: YELLOW
CREAT SERPL-MCNC: 1.61 MG/DL
CREAT SPEC-SCNC: 248 MG/DL
CREAT/PROT UR: 0.1 RATIO
EOSINOPHIL # BLD AUTO: 0.05 K/UL
EOSINOPHIL NFR BLD AUTO: 1 %
GLUCOSE QUALITATIVE U: NEGATIVE
GLUCOSE SERPL-MCNC: 90 MG/DL
HCT VFR BLD CALC: 37.4 %
HGB BLD-MCNC: 11 G/DL
HYALINE CASTS: 3 /LPF
IMM GRANULOCYTES NFR BLD AUTO: 0.4 %
KETONES URINE: NEGATIVE
LEUKOCYTE ESTERASE URINE: NEGATIVE
LYMPHOCYTES # BLD AUTO: 1.57 K/UL
LYMPHOCYTES NFR BLD AUTO: 32.4 %
MAGNESIUM SERPL-MCNC: 2 MG/DL
MAN DIFF?: NORMAL
MCHC RBC-ENTMCNC: 29.4 GM/DL
MCHC RBC-ENTMCNC: 31.3 PG
MCV RBC AUTO: 106.6 FL
MICROSCOPIC-UA: NORMAL
MONOCYTES # BLD AUTO: 0.57 K/UL
MONOCYTES NFR BLD AUTO: 11.8 %
NEUTROPHILS # BLD AUTO: 2.62 K/UL
NEUTROPHILS NFR BLD AUTO: 54.2 %
NITRITE URINE: NEGATIVE
PARATHYROID HORMONE INTACT: 79 PG/ML
PH URINE: 5.5
PHOSPHATE SERPL-MCNC: 4.8 MG/DL
PLATELET # BLD AUTO: 194 K/UL
POTASSIUM SERPL-SCNC: 5.3 MMOL/L
PROT SERPL-MCNC: 7.5 G/DL
PROT UR-MCNC: 32 MG/DL
PROTEIN URINE: NORMAL
RBC # BLD: 3.51 M/UL
RBC # FLD: 11.9 %
RED BLOOD CELLS URINE: 3 /HPF
SODIUM SERPL-SCNC: 143 MMOL/L
SPECIFIC GRAVITY URINE: 1.02
SQUAMOUS EPITHELIAL CELLS: 1 /HPF
URATE SERPL-MCNC: 6.7 MG/DL
UROBILINOGEN URINE: NORMAL
WBC # FLD AUTO: 4.84 K/UL
WHITE BLOOD CELLS URINE: 1 /HPF

## 2021-09-10 ENCOUNTER — APPOINTMENT (OUTPATIENT)
Dept: NEPHROLOGY | Facility: CLINIC | Age: 86
End: 2021-09-10
Payer: MEDICARE

## 2021-09-10 VITALS — DIASTOLIC BLOOD PRESSURE: 74 MMHG | SYSTOLIC BLOOD PRESSURE: 112 MMHG | HEART RATE: 74 BPM

## 2021-09-10 PROCEDURE — 99214 OFFICE O/P EST MOD 30 MIN: CPT

## 2021-09-10 NOTE — PHYSICAL EXAM
[General Appearance - Alert] : alert [General Appearance - In No Acute Distress] : in no acute distress [Sclera] : the sclera and conjunctiva were normal [Jugular Venous Distention Increased] : there was no jugular-venous distention [Auscultation Breath Sounds / Voice Sounds] : lungs were clear to auscultation bilaterally [Heart Sounds Gallop] : no gallops [Murmurs] : no murmurs [Heart Sounds Pericardial Friction Rub] : no pericardial rub [Edema] : there was no peripheral edema [Abdomen Soft] : soft [Abdomen Tenderness] : non-tender [No CVA Tenderness] : no ~M costovertebral angle tenderness [Involuntary Movements] : no involuntary movements were seen [] : no rash [No Focal Deficits] : no focal deficits [Oriented To Time, Place, And Person] : oriented to person, place, and time [Affect] : the affect was normal [Normal] : the heart rate was normal [Irregularly Irregular] : the rhythm was irregularly irregular

## 2021-09-10 NOTE — HISTORY OF PRESENT ILLNESS
[FreeTextEntry1] : f/u CKD\par no complaints , no pulm , cardiac sx \par reports BP was good with PCP and was 111/66 at endo visit - actually has been  taking a whole lisinopril not a half  throughout  (takes 1/2 methimazole still) \par meds reviewed with pt \par labs done and reviewed - see below, renal sono reviewed - no hydro \par PCP Dr Montgomery\par card Dr Valadez

## 2021-09-10 NOTE — ASSESSMENT
[FreeTextEntry1] : CKD 3b likely due to nephrosclerosis --stable function since May \par no proteinuria , lytes ok \par HTN appears well controlled on regimen -- to even low side but per cardiology wants to keep lower for cardiomyopathy\par on ACEI and aldactone and k has been  trending up slightly (now 5.3) -- we  reviewed lowering K intake-- seems on relatively high K diet \par may need to consider lower ACEI if K or creat trends up further - or consider K binder if cardiology doesn’t wish to lower ACE bec of cardiac disease\par f/u 3 mos\par \par

## 2021-11-29 ENCOUNTER — APPOINTMENT (OUTPATIENT)
Dept: RADIOLOGY | Facility: CLINIC | Age: 86
End: 2021-11-29

## 2021-11-29 ENCOUNTER — OUTPATIENT (OUTPATIENT)
Dept: OUTPATIENT SERVICES | Facility: HOSPITAL | Age: 86
LOS: 1 days | End: 2021-11-29
Payer: MEDICARE

## 2021-11-29 PROCEDURE — 77085 DXA BONE DENSITY AXL VRT FX: CPT | Mod: 26

## 2021-12-02 NOTE — DATA REVIEWED
[FreeTextEntry1] : Laboratories (May 3, 2021) reviewed and significant for: \par Hemoglobin 10.7 g/dL (normal: 11.7-15.5); otherwise unremarkable complete blood count\par BUN/creatinine 34/1.44 mg/dL (eGFR 38 mL/min); otherwise unremarkable comprehensive metabolic panel\par TSH 1.39 uIU/mL (normal: 0.40-4.0)\par Free T4 1.0 ng/dL (normal: 0.8-1.8)\par See scanned results.\par \par Thyroid ultrasound (November 15, 2019) reviewed and significant for:\par RIGHT LOBE:\par Dimensions: 4.3 x 1.8 x 1.5 cm (sagittal x AP x transverse)\par Echotexture: homogeneous\par Vascularity: normovascular\par The right lobe contains a few nodules with largest nodule described below. Subcentimeter nodules include an upper pole 0.7 cm isoechoic solid nodule and a lower pole 0.7 cm isoechoic nodule with a complete ring of eccentric calcifications.\par \par Nodule 1:\par Location: Interpolar \par Dimensions: 1.3 x 0.7 x 1.0 cm (sagittal x AP x transverse), stable in size from prior sonography from 11/5/2018.\par Composition: Solid with cystic components.\par For solid nodules or for the solid portion of a partially cystic nodule, does the solid portion have any of the following suspicious features?\par -  No: Hypoechoic\par -  No: Microcalcifications\par -  No: Irregular margin (infiltrative or microlobulated)\par -  No: Taller than wide (AP dimension > transverse)\par -  No: Extrathyroidal extension\par -  No: Interrupted rim calcification with soft tissue extrusion\par \par LEFT LOBE:\par Dimensions: 4.4 x 1.8 x 2.1 cm (sagittal x AP x transverse)\par Echotexture: homogeneous\par Vascularity: normovascular\par The left lobe contains a few nodules, with the largest nodule described below. Subcentimeter nodules include upper pole 0.8 cm spongiform colloid cyst and a new 0.8 cm spongiform colloid cyst in the interpolar region.\par \par Nodule 1:\par Location: Interpolar/lower pole \par Dimensions: 2.3 x 1.9 x 1.9 cm (sagittal x AP x transverse), stable in size since 11/5/2018.\par Composition: Solid with cystic components\par For solid nodules or for the solid portion of a partially cystic nodule, does the solid portion have any of the following suspicious features?\par -  No: Hypoechoic\par -  No: Microcalcifications\par -  No: Irregular margin (infiltrative or microlobulated)\par -  No: Taller than wide (AP dimension > transverse)\par -  No: Extrathyroidal extension\par -  No: Interrupted rim calcification with soft tissue extrusion\par \par ISTHMUS:\par Dimensions: 0.2 cm AP\par The isthmus lobe contains no visible nodules.\par \par CERVICAL LYMPH NODE EVALUATION (for any abnormal lymph node, please note the following: location, size, calcification, cystic area, absence of central hilum, round shape, abnormal blood flow): \par -  No abnormal lymph nodes are identified in the neck.\par \par PARATHYROID EXAMINATION:\par -  Parathyroid glands not visualized.\par \par IMPRESSION: Multinodular goiter.\par Since 11/15/2019, there are stable appearance of the 1.3 cm right interpolar and 2.3 cm left interpolar/lower pole nodules. There are subcentimeter nodules are seen in the thyroid lobes bilaterally that do not meet criteria for FNA, however 12 month follow-up is recommended to determine interval changes.\par

## 2021-12-02 NOTE — PHYSICAL EXAM
[Alert] : alert [Healthy Appearance] : healthy appearance [No Acute Distress] : no acute distress [Normal Sclera/Conjunctiva] : normal sclera/conjunctiva [No Neck Mass] : no neck mass was observed [No LAD] : no lymphadenopathy [Supple] : the neck was supple [Thyroid Not Enlarged] : the thyroid was not enlarged [No Respiratory Distress] : no respiratory distress [No Spinal Tenderness] : no spinal tenderness [No Stigmata of Cushings Syndrome] : no stigmata of Cushings Syndrome [Normal Gait] : normal gait [Normal Insight/Judgement] : insight and judgment were intact [Kyphosis] : no kyphosis present [Scoliosis] : no scoliosis [Acanthosis Nigricans] : no acanthosis nigricans [de-identified] : approximately 2 cm left pole nodule, rubbery [de-identified] : no moon facies, no supraclavicular fat pads [de-identified] : + difficulty balancing on one leg bilaterally

## 2021-12-02 NOTE — ADDENDUM
[FreeTextEntry1] : Recent bone density as below. Bone density appears overall stable at the lumbar spine, with a possible improvement at the femoral neck. I will discuss with Ms. Cruz at her upcoming appointment. 12/02/21

## 2021-12-02 NOTE — RESULTS/DATA
[Hologic] : hologic [L1 - L4] : L1 - L4 [BMD ___ g/cm2] : BMD: [unfilled] g/cm2 [T-Score ___] : T-score: [unfilled] [FreeTextEntry2] : November 15, 2019 [de-identified] : previous T-score -3.9 in 2017 at outside facility

## 2021-12-02 NOTE — HISTORY OF PRESENT ILLNESS
[FreeTextEntry1] : Ms. Cruz is an 86 year-old woman with a history of multiple medical problems including osteoporosis, atrial fibrillation, hyperthyroidism, thyroid nodules presenting for follow-up of her endocrine issues. I saw her for an initial visit in June 2020 and last in December; she is a former patient of Dr. Neha Ross.\par \par Bone History\par Osteoporosis diagnosed many years ago on routine bone density (no report available); most recent bone density as below\par Fracture history: None\par Family history: No parental history of hip fracture\par Treatment: \par Remote history of alendronate and ibandronate\par Zoledronic acid 5 mg IV in December 2013\par Denosumab 60 mg SC in September 2016, February 2017, October 2017, December 2018 (spring dose skipped), December 2019 (spring dose skipped), July 2020, January 2021\par \par Falls: No\par Height loss: About 2 inches\par Kidney stones: No\par Dental health: Full dentures\par Exercise: Walks daily\par Dairy intake: Rare\par Calcium supplements: 500 mg daily\par Multivitamin: None\par Vitamin D supplements: 1000 intl units most days\par \par Osteoporosis risk factors include: Postmenopausal status,  race, prior fracture, falls, height loss, small thin bones, tobacco use, excessive alcohol, anorexia, family history, vitamin D deficiency, corticosteroid use, seizure medications, malabsorption, hyperparathyroidism, hyperthyroidism.\par NEGATIVE EXCEPT: Postmenopausal status, hyperthyroidism\par \par Hyperthyroidism. Thyroid nodules. \par She was diagnosed with hyperthyroidism many years ago. She has been on methimazole since at least 2016, most recently 2.5 mg daily since 2017.\par She has bilateral thyroid nodules, with a left 2.4 cm dominant nodule. \par I-123 thyroid uptake and scan in June 2018 demonstrated diffuse uptake at 51.8% not localizing to the left 2.4 cm nodule, however, TSH receptor antibody and thyroid stimulating immunoglobulin negative. \par Biopsy of the left 2.6 cm nodule benign ([place] II) in July 2018.\par No history of radiation exposure.\par No family history of thyroid disease.\par \par Interim History \par She saw Dr. George Bustamante; note reviewed. \par Recent laboratory results as below. Thyroid function within range.\par No dysphagia, fixed/hard neck mass, orthopnea. No change in weight, palpitations, diarrhea/constipation, depression/anxiety. \par Medical and surgical history, medications, allergies, social and family history reviewed and updated as needed.

## 2021-12-08 LAB
ALBUMIN SERPL ELPH-MCNC: 4.6 G/DL
ALP BLD-CCNC: 67 U/L
ALT SERPL-CCNC: 9 U/L
ANION GAP SERPL CALC-SCNC: 14 MMOL/L
AST SERPL-CCNC: 18 U/L
BILIRUB SERPL-MCNC: 0.5 MG/DL
BUN SERPL-MCNC: 36 MG/DL
CALCIUM SERPL-MCNC: 10.2 MG/DL
CHLORIDE SERPL-SCNC: 104 MMOL/L
CO2 SERPL-SCNC: 22 MMOL/L
CREAT SERPL-MCNC: 1.41 MG/DL
GLUCOSE SERPL-MCNC: 87 MG/DL
MAGNESIUM SERPL-MCNC: 1.7 MG/DL
PHOSPHATE SERPL-MCNC: 4.3 MG/DL
POTASSIUM SERPL-SCNC: 5.1 MMOL/L
PROT SERPL-MCNC: 7.3 G/DL
SODIUM SERPL-SCNC: 139 MMOL/L
URATE SERPL-MCNC: 5.6 MG/DL

## 2021-12-17 ENCOUNTER — APPOINTMENT (OUTPATIENT)
Dept: ENDOCRINOLOGY | Facility: CLINIC | Age: 86
End: 2021-12-17
Payer: MEDICARE

## 2021-12-17 VITALS
SYSTOLIC BLOOD PRESSURE: 106 MMHG | HEART RATE: 86 BPM | HEIGHT: 63 IN | DIASTOLIC BLOOD PRESSURE: 72 MMHG | WEIGHT: 123 LBS | BODY MASS INDEX: 21.79 KG/M2

## 2021-12-17 PROCEDURE — 99214 OFFICE O/P EST MOD 30 MIN: CPT

## 2021-12-17 NOTE — HISTORY OF PRESENT ILLNESS
[FreeTextEntry1] : Ms. Cruz is an 87 year-old woman with a history of multiple medical problems including osteoporosis, atrial fibrillation, hyperthyroidism, thyroid nodules presenting for follow-up of her endocrine issues. I saw her for an initial visit in June 2020 and last in June 2021; she is a former patient of Dr. Neha Ross.\par \par Bone History\par Osteoporosis diagnosed many years ago on routine bone density (no report available); most recent bone density as below\par Fracture history: None\par Family history: No parental history of hip fracture\par Treatment: \par Remote history of alendronate and ibandronate\par Zoledronic acid 5 mg IV in December 2013\par Denosumab 60 mg SC in September 2016, February 2017, October 2017, December 2018 (spring dose skipped), December 2019 (spring dose skipped), July 2020, January 2021, missed dose in July 2021 due to administrative issues\par \par Falls: No\par Height loss: About 2 inches\par Kidney stones: No\par Dental health: Full dentures\par Exercise: Walks daily\par Dairy intake: Rare\par Calcium supplements: 500 mg daily\par Multivitamin: None\par Vitamin D supplements: 1000 intl units most days\par \par Osteoporosis risk factors include: Postmenopausal status,  race, prior fracture, falls, height loss, small thin bones, tobacco use, excessive alcohol, anorexia, family history, vitamin D deficiency, corticosteroid use, seizure medications, malabsorption, hyperparathyroidism, hyperthyroidism.\par NEGATIVE EXCEPT: Postmenopausal status, hyperthyroidism\par \par Hyperthyroidism. Thyroid nodules. \par She was diagnosed with hyperthyroidism many years ago. She has been on methimazole since at least 2016, most recently 2.5 mg daily since 2017.\par She has bilateral thyroid nodules, with a left 2.4 cm dominant nodule. \par I-123 thyroid uptake and scan in June 2018 demonstrated diffuse uptake at 51.8% not localizing to the left 2.4 cm nodule, however, TSH receptor antibody and thyroid stimulating immunoglobulin negative. \par Biopsy of the left 2.6 cm nodule benign ([place] II) in July 2018.\par No history of radiation exposure.\par No family history of thyroid disease.\par \par Interim History \par She missed her dose of denosumab in July 2021 due to administrative issues. She did not send us her signed consent form.\par Recent bone density as below. Bone density appears overall stable at the lumbar spine, with a possible improvement at the femoral neck. \par She saw Dr. George Bustamante; note reviewed. Recent laboratory results as below. \par No dysphagia, fixed/hard neck mass, orthopnea. No change in weight, palpitations, diarrhea/constipation, depression/anxiety. \par Medical and surgical history, medications, allergies, social and family history reviewed and updated as needed.

## 2021-12-17 NOTE — ASSESSMENT
[FreeTextEntry1] : Osteoporosis. She has no history of fragility fracture. She has a remote history of alendronate and ibandronate use, zoledronic acid in 2013, and most recently denosumab since September 2016 with three skipped doses. Her last bone density appears overall stable at the lumbar spine, with a possible improvement at the femoral neck. We discussed the potential benefits and risks of antiresorptive osteoporosis therapy at length, including but not limited to osteonecrosis of the jaw and atypical femoral fracture. She is tolerating denosumab and we will continue. We discussed that denosumab must be dosed every 6 months due to rebound increase in bone breakdown with abrupt discontinuation of therapy, with transition to bisphosphonate therapy prior to a "drug holiday." We will clarify the need for consent forms with the infusion center.\par Continue denosumab 60 mg SC every 6 months; next dose overdue\par Calcium 1200 mg daily from diet and supplements (to be taken in divided doses as no more than 500-600 mg can be absorbed at one time); advised supplemental calcium\par Continue current vitamin D regimen\par Diet, exercise and fall prevention discussed\par She has declined physical therapy for balance and bone health but will further consider\par \par Hyperthyroidism. Thyroid nodules. 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 TSH receptor antibody and thyroid stimulating immunoglobulin are negative. She has been clinically and biochemically euthyroid on a low dose of methimazole. We discussed that approximately 95 percent of all thyroid nodules are caused by benign conditions. We discussed that thyroid nodules are very common and less likely to be cancer in older individuals. We discussed the risks and benefits of evaluation for thyroid cancer and morbidity of treatment if diagnosed versus overall prognosis of thyroid cancer. We will defer further ultrasound surveillance. \par Continue methimazole 2.5 mg daily pending thyroid function tests with next blood tests\par \par Return to see me in 6 months or earlier as needed. \par \par I reviewed the DXA performed on November 29, 2021 with the patient today.\par I reviewed the thyroid ultrasound performed on December 1, 2021 with the patient today. \par I counseled the patient regarding calcium and vitamin D intake today.\par I discussed the following osteoporosis therapies: Denosumab\par \par CC:\par Dr. Debbi Montgomery, Fax [phone]

## 2021-12-17 NOTE — PHYSICAL EXAM
[Alert] : alert [Healthy Appearance] : healthy appearance [No Acute Distress] : no acute distress [Normal Sclera/Conjunctiva] : normal sclera/conjunctiva [No Neck Mass] : no neck mass was observed [No LAD] : no lymphadenopathy [Supple] : the neck was supple [Thyroid Not Enlarged] : the thyroid was not enlarged [No Respiratory Distress] : no respiratory distress [No Spinal Tenderness] : no spinal tenderness [No Stigmata of Cushings Syndrome] : no stigmata of Cushings Syndrome [Normal Gait] : normal gait [Normal Insight/Judgement] : insight and judgment were intact [Kyphosis] : no kyphosis present [Scoliosis] : no scoliosis [Acanthosis Nigricans] : no acanthosis nigricans [de-identified] : approximately 2 cm left pole nodule, rubbery [de-identified] : no moon facies, no supraclavicular fat pads [de-identified] : + difficulty balancing on one leg bilaterally

## 2021-12-17 NOTE — RESULTS/DATA
[Hologic] : hologic [L1 - L4] : L1 - L4 [BMD ___ g/cm2] : BMD: [unfilled] g/cm2 [T-Score ___] : T-score: [unfilled] [FreeTextEntry2] : November 15, 2019 [de-identified] : previous T-score -3.9 in 2017 at outside facility

## 2021-12-20 ENCOUNTER — APPOINTMENT (OUTPATIENT)
Dept: NEPHROLOGY | Facility: CLINIC | Age: 86
End: 2021-12-20
Payer: MEDICARE

## 2021-12-20 VITALS — SYSTOLIC BLOOD PRESSURE: 122 MMHG | DIASTOLIC BLOOD PRESSURE: 73 MMHG | HEART RATE: 74 BPM

## 2021-12-20 DIAGNOSIS — I38 ENDOCARDITIS, VALVE UNSPECIFIED: ICD-10-CM

## 2021-12-20 PROCEDURE — 99213 OFFICE O/P EST LOW 20 MIN: CPT

## 2021-12-20 NOTE — HISTORY OF PRESENT ILLNESS
[FreeTextEntry1] : f/u CKD\par no complaints\par meds reviewed with pt -- no change\par no CHF sx, no dizziness\par BP "very good" with cardiologist\par PCP Dr Montgomery\par card- Dr Valadez

## 2021-12-20 NOTE — ASSESSMENT
[FreeTextEntry1] : CKD 3 stable to improved fxn\par CHF controlled \par BP controlled \par no proteinuria on ESTELLA agents \par cont regimen \par f/u 4-6 mos

## 2022-01-04 ENCOUNTER — NON-APPOINTMENT (OUTPATIENT)
Age: 87
End: 2022-01-04

## 2022-01-26 ENCOUNTER — MED ADMIN CHARGE (OUTPATIENT)
Age: 87
End: 2022-01-26

## 2022-01-26 ENCOUNTER — APPOINTMENT (OUTPATIENT)
Dept: RHEUMATOLOGY | Facility: CLINIC | Age: 87
End: 2022-01-26
Payer: MEDICARE

## 2022-01-26 VITALS
DIASTOLIC BLOOD PRESSURE: 71 MMHG | RESPIRATION RATE: 14 BRPM | OXYGEN SATURATION: 98 % | SYSTOLIC BLOOD PRESSURE: 120 MMHG | HEART RATE: 65 BPM | TEMPERATURE: 97.7 F

## 2022-01-26 PROCEDURE — 96401 CHEMO ANTI-NEOPL SQ/IM: CPT

## 2022-01-26 RX ORDER — DENOSUMAB 60 MG/ML
60 INJECTION SUBCUTANEOUS
Qty: 1 | Refills: 0 | Status: COMPLETED | OUTPATIENT
Start: 2021-07-25

## 2022-03-02 LAB
25(OH)D3 SERPL-MCNC: 33.4 NG/ML
ALBUMIN SERPL ELPH-MCNC: 4.8 G/DL
ALP BLD-CCNC: 57 U/L
ALT SERPL-CCNC: 10 U/L
ANION GAP SERPL CALC-SCNC: 13 MMOL/L
AST SERPL-CCNC: 17 U/L
BASOPHILS # BLD AUTO: 0.01 K/UL
BASOPHILS NFR BLD AUTO: 0.2 %
BILIRUB SERPL-MCNC: 0.6 MG/DL
BUN SERPL-MCNC: 34 MG/DL
CALCIUM SERPL-MCNC: 9.4 MG/DL
CALCIUM SERPL-MCNC: 9.4 MG/DL
CHLORIDE SERPL-SCNC: 101 MMOL/L
CO2 SERPL-SCNC: 21 MMOL/L
CREAT SERPL-MCNC: 1.46 MG/DL
CREAT SPEC-SCNC: 98 MG/DL
CREAT SPEC-SCNC: 98 MG/DL
CREAT/PROT UR: 0.1 RATIO
EGFR: 35 ML/MIN/1.73M2
EOSINOPHIL # BLD AUTO: 0.04 K/UL
EOSINOPHIL NFR BLD AUTO: 1 %
GLUCOSE SERPL-MCNC: 78 MG/DL
HCT VFR BLD CALC: 36.8 %
HGB BLD-MCNC: 11.3 G/DL
IMM GRANULOCYTES NFR BLD AUTO: 0.2 %
LYMPHOCYTES # BLD AUTO: 1.74 K/UL
LYMPHOCYTES NFR BLD AUTO: 41.9 %
MAGNESIUM SERPL-MCNC: 1.8 MG/DL
MAN DIFF?: NORMAL
MCHC RBC-ENTMCNC: 30.7 GM/DL
MCHC RBC-ENTMCNC: 30.9 PG
MCV RBC AUTO: 100.5 FL
MICROALBUMIN 24H UR DL<=1MG/L-MCNC: 1.2 MG/DL
MICROALBUMIN/CREAT 24H UR-RTO: 13 MG/G
MONOCYTES # BLD AUTO: 0.45 K/UL
MONOCYTES NFR BLD AUTO: 10.8 %
NEUTROPHILS # BLD AUTO: 1.9 K/UL
NEUTROPHILS NFR BLD AUTO: 45.9 %
PARATHYROID HORMONE INTACT: 193 PG/ML
PHOSPHATE SERPL-MCNC: 3.8 MG/DL
PLATELET # BLD AUTO: 193 K/UL
POTASSIUM SERPL-SCNC: 4.7 MMOL/L
PROT SERPL-MCNC: 7.3 G/DL
PROT UR-MCNC: 9 MG/DL
RBC # BLD: 3.66 M/UL
RBC # FLD: 11.9 %
SODIUM SERPL-SCNC: 135 MMOL/L
URATE SERPL-MCNC: 6.7 MG/DL
WBC # FLD AUTO: 4.15 K/UL

## 2022-03-03 LAB
APPEARANCE: CLEAR
BACTERIA: ABNORMAL
BILIRUBIN URINE: NEGATIVE
BLOOD URINE: NEGATIVE
COLOR: NORMAL
GLUCOSE QUALITATIVE U: NEGATIVE
HYALINE CASTS: 0 /LPF
KETONES URINE: NEGATIVE
LEUKOCYTE ESTERASE URINE: NEGATIVE
MICROSCOPIC-UA: NORMAL
NITRITE URINE: NEGATIVE
PH URINE: 6
PROTEIN URINE: NEGATIVE
RED BLOOD CELLS URINE: 1 /HPF
SPECIFIC GRAVITY URINE: 1.01
SQUAMOUS EPITHELIAL CELLS: 0 /HPF
UROBILINOGEN URINE: NORMAL
WHITE BLOOD CELLS URINE: 0 /HPF

## 2022-05-23 ENCOUNTER — APPOINTMENT (OUTPATIENT)
Dept: NEPHROLOGY | Facility: CLINIC | Age: 87
End: 2022-05-23
Payer: MEDICARE

## 2022-05-23 VITALS
WEIGHT: 128 LBS | SYSTOLIC BLOOD PRESSURE: 114 MMHG | BODY MASS INDEX: 22.67 KG/M2 | DIASTOLIC BLOOD PRESSURE: 75 MMHG | HEART RATE: 62 BPM

## 2022-05-23 PROCEDURE — 99213 OFFICE O/P EST LOW 20 MIN: CPT

## 2022-05-23 NOTE — ASSESSMENT
[FreeTextEntry1] : CKD 3 stable fxn\par BP controlled \par lytes good\par no albuminuria on ACEI\par cont regimen \par f/u 6 mos

## 2022-05-23 NOTE — HISTORY OF PRESENT ILLNESS
[FreeTextEntry1] : f/u  CKD \par no complaints \par meds reviewed with pt --no changes\par labs done and reviewed - see below\par BP has been good at doctor visits - Not checking BP at home\par PCP Dr Montgomery

## 2022-06-16 ENCOUNTER — APPOINTMENT (OUTPATIENT)
Dept: ENDOCRINOLOGY | Facility: CLINIC | Age: 87
End: 2022-06-16
Payer: MEDICARE

## 2022-06-16 VITALS
HEART RATE: 103 BPM | BODY MASS INDEX: 22.15 KG/M2 | DIASTOLIC BLOOD PRESSURE: 56 MMHG | HEIGHT: 63 IN | WEIGHT: 125 LBS | SYSTOLIC BLOOD PRESSURE: 111 MMHG

## 2022-06-16 PROCEDURE — 99214 OFFICE O/P EST MOD 30 MIN: CPT

## 2022-06-16 NOTE — ASSESSMENT
[FreeTextEntry1] : Osteoporosis. She has no history of fragility fracture. She has a remote history of alendronate and ibandronate use, zoledronic acid in 2013, and most recently denosumab since September 2016 with three skipped doses. Her last bone density appears overall stable at the lumbar spine, with a possible improvement at the femoral neck. We discussed the potential benefits and risks of antiresorptive osteoporosis therapy, including but not limited to osteonecrosis of the jaw and atypical femoral fracture. She is tolerating denosumab and we will continue. We discussed that denosumab must be dosed every 6 months due to rebound increase in bone breakdown with abrupt discontinuation of therapy, with transition to bisphosphonate therapy prior to a "drug holiday." She has been scheduled for her next dose.\par Continue denosumab 60 mg SC every 6 months; next dose scheduled in July 2022\par Calcium 1200 mg daily from diet and supplements (to be taken in divided doses as no more than 500-600 mg can be absorbed at one time); advised additional supplemental calcium\par Continue current vitamin D regimen\par Diet, exercise and fall prevention discussed\par She will further consider physical therapy for balance and bone health; prescription provided\par \par Hyperthyroidism. Thyroid nodules. 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 TSH receptor antibody and thyroid stimulating immunoglobulin are negative. She has been clinically and biochemically euthyroid on a low dose of methimazole. We discussed that approximately 95 percent of all thyroid nodules are caused by benign conditions. We discussed that thyroid nodules are very common and less likely to be cancer in older individuals. We discussed the risks and benefits of evaluation for thyroid cancer and morbidity of treatment if diagnosed versus overall prognosis of thyroid cancer. We will defer further ultrasound surveillance. \par Continue methimazole 2.5 mg daily \par \par Screening for diabetes mellitus. Random glucose elevated from laboratory results in May 2022. We will send HbA1c with next blood tests.\par \par Return to see me in 6 months or earlier as needed. \par \par I reviewed the DXA performed on November 29, 2021 with the patient today.\par I reviewed the thyroid ultrasound performed on Mar 17, 2022 with the patient today. \par I counseled the patient regarding calcium and vitamin D intake today.\par I discussed the following osteoporosis therapies: Denosumab\par \par CC:\par Dr. Debbi Montgomery, Fax [phone]

## 2022-06-16 NOTE — PHYSICAL EXAM
[Alert] : alert [Healthy Appearance] : healthy appearance [No Acute Distress] : no acute distress [Normal Sclera/Conjunctiva] : normal sclera/conjunctiva [No Neck Mass] : no neck mass was observed [No LAD] : no lymphadenopathy [Supple] : the neck was supple [Thyroid Not Enlarged] : the thyroid was not enlarged [No Respiratory Distress] : no respiratory distress [No Spinal Tenderness] : no spinal tenderness [No Stigmata of Cushings Syndrome] : no stigmata of Cushings Syndrome [Normal Gait] : normal gait [Normal Insight/Judgement] : insight and judgment were intact [Kyphosis] : no kyphosis present [Scoliosis] : no scoliosis [Acanthosis Nigricans] : no acanthosis nigricans [de-identified] : approximately 2 cm left pole nodule, rubbery [de-identified] : no moon facies, no supraclavicular fat pads [de-identified] : + difficulty balancing on one leg bilaterally

## 2022-06-16 NOTE — DATA REVIEWED
[FreeTextEntry1] : Laboratories (May 17, 2022) reviewed and significant for: \par Hemoglobin 10.0 g/dL (normal: 11.7-15.5); otherwise unremarkable complete blood count\par BUN/creatinine 54/3.17 mg/dL (eGFR 18 mL/min)\par Glucose 174 mg/dL\par TSH 1.29 uIU/mL (normal: 0.40-4.0)\par Free T4 1.3 ng/dL (normal: 0.8-1.8)\par 25-hydroxyvitamin D 64 ng/mL\par See scanned results.\par \par Thyroid ultrasound (November 15, 2019) reviewed and significant for:\par RIGHT LOBE:\par Dimensions: 4.3 x 1.8 x 1.5 cm (sagittal x AP x transverse)\par Echotexture: homogeneous\par Vascularity: normovascular\par The right lobe contains a few nodules with largest nodule described below. Subcentimeter nodules include an upper pole 0.7 cm isoechoic solid nodule and a lower pole 0.7 cm isoechoic nodule with a complete ring of eccentric calcifications.\par \par Nodule 1:\par Location: Interpolar \par Dimensions: 1.3 x 0.7 x 1.0 cm (sagittal x AP x transverse), stable in size from prior sonography from 11/5/2018.\par Composition: Solid with cystic components.\par For solid nodules or for the solid portion of a partially cystic nodule, does the solid portion have any of the following suspicious features?\par -  No: Hypoechoic\par -  No: Microcalcifications\par -  No: Irregular margin (infiltrative or microlobulated)\par -  No: Taller than wide (AP dimension > transverse)\par -  No: Extrathyroidal extension\par -  No: Interrupted rim calcification with soft tissue extrusion\par \par LEFT LOBE:\par Dimensions: 4.4 x 1.8 x 2.1 cm (sagittal x AP x transverse)\par Echotexture: homogeneous\par Vascularity: normovascular\par The left lobe contains a few nodules, with the largest nodule described below. Subcentimeter nodules include upper pole 0.8 cm spongiform colloid cyst and a new 0.8 cm spongiform colloid cyst in the interpolar region.\par \par Nodule 1:\par Location: Interpolar/lower pole \par Dimensions: 2.3 x 1.9 x 1.9 cm (sagittal x AP x transverse), stable in size since 11/5/2018.\par Composition: Solid with cystic components\par For solid nodules or for the solid portion of a partially cystic nodule, does the solid portion have any of the following suspicious features?\par -  No: Hypoechoic\par -  No: Microcalcifications\par -  No: Irregular margin (infiltrative or microlobulated)\par -  No: Taller than wide (AP dimension > transverse)\par -  No: Extrathyroidal extension\par -  No: Interrupted rim calcification with soft tissue extrusion\par \par ISTHMUS:\par Dimensions: 0.2 cm AP\par The isthmus lobe contains no visible nodules.\par \par CERVICAL LYMPH NODE EVALUATION (for any abnormal lymph node, please note the following: location, size, calcification, cystic area, absence of central hilum, round shape, abnormal blood flow): \par -  No abnormal lymph nodes are identified in the neck.\par \par PARATHYROID EXAMINATION:\par -  Parathyroid glands not visualized.\par \par IMPRESSION: Multinodular goiter.\par Since 11/15/2019, there are stable appearance of the 1.3 cm right interpolar and 2.3 cm left interpolar/lower pole nodules. There are subcentimeter nodules are seen in the thyroid lobes bilaterally that do not meet criteria for FNA, however 12 month follow-up is recommended to determine interval changes.\par

## 2022-06-16 NOTE — HISTORY OF PRESENT ILLNESS
[FreeTextEntry1] : Ms. Cruz is an 87 year-old woman with a history of multiple medical problems including osteoporosis, atrial fibrillation, hyperthyroidism, thyroid nodules presenting for follow-up of her endocrine issues. I saw her for an initial visit in June 2020 and last in December 2021; she is a former patient of Dr. Neha Ross.\par \par Bone History\par Osteoporosis diagnosed many years ago on routine bone density (no report available); most recent bone density as below\par Fracture history: None\par Family history: No parental history of hip fracture\par Treatment: \par Remote history of alendronate and ibandronate\par Zoledronic acid 5 mg IV in December 2013\par Denosumab 60 mg SC in September 2016, February 2017, October 2017, December 2018 (spring dose skipped), December 2019 (spring dose skipped), July 2020, January 2021, missed dose in July 2021 due to administrative issues, January 2022\par \par Falls: No\par Height loss: About 2 inches\par Kidney stones: No\par Dental health: Full dentures\par Exercise: Walks daily\par Dairy intake: Rare\par Calcium supplements: 500 mg daily\par Multivitamin: None\par Vitamin D supplements: 1000 intl units most days\par \par Osteoporosis risk factors include: Postmenopausal status,  race, prior fracture, falls, height loss, small thin bones, tobacco use, excessive alcohol, anorexia, family history, vitamin D deficiency, corticosteroid use, seizure medications, malabsorption, hyperparathyroidism, hyperthyroidism.\par NEGATIVE EXCEPT: Postmenopausal status, hyperthyroidism\par \par Hyperthyroidism. Thyroid nodules. \par She was diagnosed with hyperthyroidism many years ago. She has been on methimazole since at least 2016, most recently 2.5 mg daily since 2017.\par She has bilateral thyroid nodules, with a left 2.4 cm dominant nodule. \par I-123 thyroid uptake and scan in June 2018 demonstrated diffuse uptake at 51.8% not localizing to the left 2.4 cm nodule, however, TSH receptor antibody and thyroid stimulating immunoglobulin negative. \par Biopsy of the left 2.6 cm nodule benign ([place] II) in July 2018.\par No history of radiation exposure.\par No family history of thyroid disease.\par \par Interim History \par She received denosumab in January 2022. \par She has seen Dr. George Bustamante; note reviewed. \par Recent laboratory results ordered by her primary care provider as below. TSH/free T4 within range. Glucose 174 mg/dL.\par She will be going to [place] later this year.\par No dysphagia, fixed/hard neck mass, orthopnea. No change in weight, palpitations, diarrhea/constipation, depression/anxiety. \par Medical and surgical history, medications, allergies, social and family history reviewed and updated as needed.

## 2022-07-27 ENCOUNTER — APPOINTMENT (OUTPATIENT)
Dept: RHEUMATOLOGY | Facility: CLINIC | Age: 87
End: 2022-07-27

## 2022-07-27 ENCOUNTER — MED ADMIN CHARGE (OUTPATIENT)
Age: 87
End: 2022-07-27

## 2022-07-27 VITALS
RESPIRATION RATE: 14 BRPM | HEART RATE: 77 BPM | SYSTOLIC BLOOD PRESSURE: 101 MMHG | OXYGEN SATURATION: 96 % | DIASTOLIC BLOOD PRESSURE: 65 MMHG | TEMPERATURE: 98.1 F

## 2022-07-27 PROCEDURE — 96401 CHEMO ANTI-NEOPL SQ/IM: CPT

## 2022-07-27 RX ORDER — DENOSUMAB 60 MG/ML
60 INJECTION SUBCUTANEOUS
Qty: 1 | Refills: 0 | Status: COMPLETED | OUTPATIENT
Start: 2021-01-25

## 2022-11-07 LAB
25(OH)D3 SERPL-MCNC: 33.1 NG/ML
ALBUMIN SERPL ELPH-MCNC: 4.8 G/DL
ALP BLD-CCNC: 62 U/L
ALT SERPL-CCNC: 11 U/L
ANION GAP SERPL CALC-SCNC: 16 MMOL/L
AST SERPL-CCNC: 19 U/L
BASOPHILS # BLD AUTO: 0.02 K/UL
BASOPHILS NFR BLD AUTO: 0.5 %
BILIRUB SERPL-MCNC: 0.3 MG/DL
BUN SERPL-MCNC: 45 MG/DL
CALCIUM SERPL-MCNC: 9.9 MG/DL
CALCIUM SERPL-MCNC: 9.9 MG/DL
CHLORIDE SERPL-SCNC: 103 MMOL/L
CO2 SERPL-SCNC: 19 MMOL/L
CREAT SERPL-MCNC: 1.73 MG/DL
CREAT SPEC-SCNC: 129 MG/DL
EGFR: 28 ML/MIN/1.73M2
EOSINOPHIL # BLD AUTO: 0.07 K/UL
EOSINOPHIL NFR BLD AUTO: 1.6 %
GLUCOSE SERPL-MCNC: 99 MG/DL
HCT VFR BLD CALC: 36 %
HGB BLD-MCNC: 11.6 G/DL
IMM GRANULOCYTES NFR BLD AUTO: 0.2 %
LYMPHOCYTES # BLD AUTO: 1.68 K/UL
LYMPHOCYTES NFR BLD AUTO: 39.3 %
MAGNESIUM SERPL-MCNC: 2 MG/DL
MAN DIFF?: NORMAL
MCHC RBC-ENTMCNC: 32 PG
MCHC RBC-ENTMCNC: 32.2 GM/DL
MCV RBC AUTO: 99.2 FL
MICROALBUMIN 24H UR DL<=1MG/L-MCNC: 1.9 MG/DL
MICROALBUMIN/CREAT 24H UR-RTO: 15 MG/G
MONOCYTES # BLD AUTO: 0.4 K/UL
MONOCYTES NFR BLD AUTO: 9.4 %
NEUTROPHILS # BLD AUTO: 2.09 K/UL
NEUTROPHILS NFR BLD AUTO: 49 %
PARATHYROID HORMONE INTACT: 233 PG/ML
PHOSPHATE SERPL-MCNC: 3.4 MG/DL
PLATELET # BLD AUTO: 201 K/UL
POTASSIUM SERPL-SCNC: 5.9 MMOL/L
PROT SERPL-MCNC: 7.7 G/DL
RBC # BLD: 3.63 M/UL
RBC # FLD: 12.3 %
SODIUM SERPL-SCNC: 139 MMOL/L
URATE SERPL-MCNC: 8.2 MG/DL
WBC # FLD AUTO: 4.27 K/UL

## 2022-11-26 LAB
ANION GAP SERPL CALC-SCNC: 13 MMOL/L
BUN SERPL-MCNC: 31 MG/DL
CALCIUM SERPL-MCNC: 9.9 MG/DL
CHLORIDE SERPL-SCNC: 102 MMOL/L
CO2 SERPL-SCNC: 23 MMOL/L
CREAT SERPL-MCNC: 1.5 MG/DL
EGFR: 33 ML/MIN/1.73M2
GLUCOSE SERPL-MCNC: 96 MG/DL
POTASSIUM SERPL-SCNC: 4.4 MMOL/L
SODIUM SERPL-SCNC: 138 MMOL/L

## 2022-12-05 ENCOUNTER — APPOINTMENT (OUTPATIENT)
Dept: NEPHROLOGY | Facility: CLINIC | Age: 87
End: 2022-12-05

## 2022-12-05 VITALS — SYSTOLIC BLOOD PRESSURE: 137 MMHG | DIASTOLIC BLOOD PRESSURE: 86 MMHG | HEART RATE: 86 BPM

## 2022-12-05 DIAGNOSIS — E87.5 HYPERKALEMIA: ICD-10-CM

## 2022-12-05 PROCEDURE — 99214 OFFICE O/P EST MOD 30 MIN: CPT

## 2022-12-05 NOTE — HISTORY OF PRESENT ILLNESS
[FreeTextEntry1] : f/u CKD, HTN \par no complaints\par taken off spironolactone bec of incerased k and creat \par BP has been good at doctor visits -- Not checking BP at home\par meds reviewed with pt and list updated\par labs done and reviewed - see below\par PCP Dr Montgomery

## 2022-12-05 NOTE — ASSESSMENT
[FreeTextEntry1] : CKD 3 -- s/p increased and creat (with relatively low BP ) -- k and cr better off aldactone \par BP up a little but acceptable\par no albuminuria\par cont regimen \par PTH up  - calcium nl - advised take vit D3 2000 IU/d -- may consider calcitriol or perhaps reyaldee -- f/u endo \par f/u 4-5 mos with labs

## 2022-12-14 ENCOUNTER — APPOINTMENT (OUTPATIENT)
Dept: ENDOCRINOLOGY | Facility: CLINIC | Age: 87
End: 2022-12-14

## 2022-12-14 VITALS
BODY MASS INDEX: 21.61 KG/M2 | SYSTOLIC BLOOD PRESSURE: 115 MMHG | HEART RATE: 76 BPM | DIASTOLIC BLOOD PRESSURE: 76 MMHG | WEIGHT: 122 LBS

## 2022-12-14 DIAGNOSIS — R73.09 OTHER ABNORMAL GLUCOSE: ICD-10-CM

## 2022-12-14 LAB
T3 SERPL-MCNC: 162 NG/DL
T4 FREE SERPL-MCNC: 1.1 NG/DL
T4 SERPL-MCNC: 8.9 UG/DL
TSH SERPL-ACNC: 1.81 UIU/ML

## 2022-12-14 PROCEDURE — 99214 OFFICE O/P EST MOD 30 MIN: CPT

## 2022-12-15 LAB
ESTIMATED AVERAGE GLUCOSE: 103 MG/DL
HBA1C MFR BLD HPLC: 5.2 %

## 2023-01-27 ENCOUNTER — MED ADMIN CHARGE (OUTPATIENT)
Age: 88
End: 2023-01-27

## 2023-01-27 ENCOUNTER — APPOINTMENT (OUTPATIENT)
Dept: RHEUMATOLOGY | Facility: CLINIC | Age: 88
End: 2023-01-27
Payer: MEDICARE

## 2023-01-27 VITALS
OXYGEN SATURATION: 98 % | SYSTOLIC BLOOD PRESSURE: 136 MMHG | RESPIRATION RATE: 14 BRPM | HEART RATE: 95 BPM | TEMPERATURE: 97.7 F | DIASTOLIC BLOOD PRESSURE: 75 MMHG

## 2023-01-27 PROCEDURE — 96401 CHEMO ANTI-NEOPL SQ/IM: CPT

## 2023-01-27 RX ORDER — DENOSUMAB 60 MG/ML
60 INJECTION SUBCUTANEOUS
Qty: 1 | Refills: 0 | Status: COMPLETED | OUTPATIENT
Start: 2023-01-20

## 2023-05-02 LAB
25(OH)D3 SERPL-MCNC: 26.6 NG/ML
ALBUMIN SERPL ELPH-MCNC: 4.6 G/DL
ALP BLD-CCNC: 57 U/L
ALT SERPL-CCNC: 10 U/L
ANION GAP SERPL CALC-SCNC: 15 MMOL/L
AST SERPL-CCNC: 21 U/L
BASOPHILS # BLD AUTO: 0.03 K/UL
BASOPHILS NFR BLD AUTO: 1 %
BILIRUB SERPL-MCNC: 0.6 MG/DL
BUN SERPL-MCNC: 35 MG/DL
CALCIUM SERPL-MCNC: 9.7 MG/DL
CALCIUM SERPL-MCNC: 9.7 MG/DL
CHLORIDE SERPL-SCNC: 104 MMOL/L
CO2 SERPL-SCNC: 23 MMOL/L
CREAT SERPL-MCNC: 1.5 MG/DL
CREAT SPEC-SCNC: 149 MG/DL
EGFR: 33 ML/MIN/1.73M2
EOSINOPHIL # BLD AUTO: 0.03 K/UL
EOSINOPHIL NFR BLD AUTO: 1 %
GLUCOSE SERPL-MCNC: 96 MG/DL
HCT VFR BLD CALC: 40.3 %
HGB BLD-MCNC: 12.2 G/DL
IMM GRANULOCYTES NFR BLD AUTO: 0 %
LYMPHOCYTES # BLD AUTO: 1.35 K/UL
LYMPHOCYTES NFR BLD AUTO: 42.9 %
MAGNESIUM SERPL-MCNC: 1.9 MG/DL
MAN DIFF?: NORMAL
MCHC RBC-ENTMCNC: 30.1 PG
MCHC RBC-ENTMCNC: 30.3 GM/DL
MCV RBC AUTO: 99.5 FL
MICROALBUMIN 24H UR DL<=1MG/L-MCNC: 4.7 MG/DL
MICROALBUMIN/CREAT 24H UR-RTO: 32 MG/G
MONOCYTES # BLD AUTO: 0.35 K/UL
MONOCYTES NFR BLD AUTO: 11.1 %
NEUTROPHILS # BLD AUTO: 1.39 K/UL
NEUTROPHILS NFR BLD AUTO: 44 %
PARATHYROID HORMONE INTACT: 273 PG/ML
PHOSPHATE SERPL-MCNC: 3.3 MG/DL
PLATELET # BLD AUTO: 241 K/UL
POTASSIUM SERPL-SCNC: 4.3 MMOL/L
PROT SERPL-MCNC: 7.4 G/DL
RBC # BLD: 4.05 M/UL
RBC # FLD: 12.2 %
SODIUM SERPL-SCNC: 142 MMOL/L
URATE SERPL-MCNC: 8.3 MG/DL
WBC # FLD AUTO: 3.15 K/UL

## 2023-05-08 ENCOUNTER — APPOINTMENT (OUTPATIENT)
Dept: NEPHROLOGY | Facility: CLINIC | Age: 88
End: 2023-05-08
Payer: MEDICARE

## 2023-05-08 VITALS
DIASTOLIC BLOOD PRESSURE: 83 MMHG | SYSTOLIC BLOOD PRESSURE: 129 MMHG | WEIGHT: 122 LBS | HEART RATE: 82 BPM | BODY MASS INDEX: 21.61 KG/M2

## 2023-05-08 PROCEDURE — 99214 OFFICE O/P EST MOD 30 MIN: CPT

## 2023-05-08 NOTE — PHYSICAL EXAM
[General Appearance - Alert] : alert [General Appearance - In No Acute Distress] : in no acute distress [Sclera] : the sclera and conjunctiva were normal [Jugular Venous Distention Increased] : there was no jugular-venous distention [Auscultation Breath Sounds / Voice Sounds] : lungs were clear to auscultation bilaterally [Heart Rate And Rhythm] : heart rate was normal and rhythm regular [Heart Sounds Gallop] : no gallops [Murmurs] : no murmurs [Heart Sounds Pericardial Friction Rub] : no pericardial rub [Edema] : there was no peripheral edema [Abdomen Soft] : soft [Abdomen Tenderness] : non-tender [Involuntary Movements] : no involuntary movements were seen [] : no rash [No Focal Deficits] : no focal deficits [Oriented To Time, Place, And Person] : oriented to person, place, and time [Affect] : the affect was normal

## 2023-05-09 NOTE — HISTORY OF PRESENT ILLNESS
[FreeTextEntry1] : f/u CKD\par no complaints - says no change in meds -- though cant recall names - says taking vit D but not sure of dose \par got Prolia last in Jan \par Not checking BP at home but good at doctor visits\par meds reviewed with pt\par labs done and reviewed - see below\par PCP Dr Montgomery

## 2023-05-09 NOTE — HISTORY OF PRESENT ILLNESS
[FreeTextEntry1] : Ms. Cruz is an 88 year-old woman with a history of multiple medical problems including osteoporosis, atrial fibrillation, hyperthyroidism, thyroid nodules presenting for follow-up of her endocrine issues. I saw her for an initial visit in June 2020 and last in June 2022; she is a former patient of Dr. Neha Ross.\par \par Bone History\par Osteoporosis diagnosed many years ago on routine bone density (no report available); most recent bone density as below\par Fracture history: None\par Family history: No parental history of hip fracture\par Treatment: \par Remote history of alendronate and ibandronate\par Zoledronic acid 5 mg IV in December 2013\par Denosumab 60 mg SC in September 2016, February 2017, October 2017, December 2018 (spring dose skipped), December 2019 (spring dose skipped), July 2020, January 2021, missed dose in July 2021 due to administrative issues, January 2022, July 2022\par \par Falls: No\par Height loss: About 2 inches\par Kidney stones: No\par Dental health: Full dentures\par Exercise: Walks daily\par Dairy intake: Rare\par Calcium supplements: 500 mg daily\par Multivitamin: None\par Vitamin D supplements: 1000 intl units most days\par \par Osteoporosis risk factors include: Postmenopausal status,  race, prior fracture, falls, height loss, small thin bones, tobacco use, excessive alcohol, anorexia, family history, vitamin D deficiency, corticosteroid use, seizure medications, malabsorption, hyperparathyroidism, hyperthyroidism.\par NEGATIVE EXCEPT: Postmenopausal status, hyperthyroidism\par \par Hyperthyroidism. Thyroid nodules. \par She was diagnosed with hyperthyroidism many years ago. She has been on methimazole since at least 2016, most recently 2.5 mg daily since 2017.\par She has bilateral thyroid nodules, with a left 2.4 cm dominant nodule. \par I-123 thyroid uptake and scan in June 2018 demonstrated diffuse uptake at 51.8% not localizing to the left 2.4 cm nodule, however, TSH receptor antibody and thyroid stimulating immunoglobulin negative. \par Biopsy of the left 2.6 cm nodule benign ([place] II) in July 2018.\par No history of radiation exposure.\par No family history of thyroid disease.\par \par Interim History \par She received denosumab in July 2022. \par She has seen Dr. George Bustamante; note reviewed. Last laboratory results reviewed.\par She was in [place] over the summer. She had a mild case of COVID-19 after her trip. She will be going on [place] in March.\par No dysphagia, fixed/hard neck mass, orthopnea. No change in weight, palpitations, diarrhea/constipation, depression/anxiety. \par Medical and surgical history, medications, allergies, social and family history reviewed and updated as needed.

## 2023-05-09 NOTE — PHYSICAL EXAM
[Alert] : alert [Healthy Appearance] : healthy appearance [No Acute Distress] : no acute distress [Normal Sclera/Conjunctiva] : normal sclera/conjunctiva [No Neck Mass] : no neck mass was observed [No LAD] : no lymphadenopathy [Supple] : the neck was supple [Thyroid Not Enlarged] : the thyroid was not enlarged [No Respiratory Distress] : no respiratory distress [No Spinal Tenderness] : no spinal tenderness [No Stigmata of Cushings Syndrome] : no stigmata of Cushings Syndrome [Normal Gait] : normal gait [Normal Insight/Judgement] : insight and judgment were intact [Kyphosis] : no kyphosis present [Scoliosis] : no scoliosis [Acanthosis Nigricans] : no acanthosis nigricans [de-identified] : approximately 2 cm left pole nodule, rubbery [de-identified] : no moon facies, no supraclavicular fat pads [de-identified] : + difficulty balancing on one leg bilaterally

## 2023-05-09 NOTE — ADDENDUM
[FreeTextEntry1] : Recent laboratory results as below; discussed with Ms. Cruz. TSH, T4/T3 within range on her current regimen of methimazole. HbA1c within the normal range. 12/15/22\par \par Dr. Bustamante requested repeat calciotropic panel at her next appointment with me. 5/09/23

## 2023-05-09 NOTE — ASSESSMENT
[FreeTextEntry1] : CKD 3 stable \par minimal to no microalbuminuria on ACEI \par BP well controlled it appears \par PTH rising- seems out of proportion to relatively mild CKD - with nl to high normal calcium and nl phos -- possible primary hyperparathyroidism? though also low vit D now and r/o secondary to this \par  increase dose vit D3 to 5000 IU/d -- f/u with endo to recheck \par f/u 5-6 mos\par \par

## 2023-05-09 NOTE — ASSESSMENT
[FreeTextEntry1] : Osteoporosis. She has no history of fragility fracture. She has a remote history of alendronate and ibandronate use, zoledronic acid in 2013, and most recently denosumab from September 2016 to present with three skipped doses. Her last bone density appeared overall stable at the lumbar spine, with a possible improvement at the femoral neck. We discussed the potential benefits and risks of antiresorptive osteoporosis therapy, including but not limited to osteonecrosis of the jaw and atypical femoral fracture. She is tolerating denosumab and we will continue. We discussed that denosumab must be dosed every 6 months due to rebound increase in bone breakdown with abrupt discontinuation of therapy, with transition to bisphosphonate therapy prior to a "drug holiday." She has been scheduled for her next dose.\par Continue denosumab 60 mg SC every 6 months; next dose scheduled in January 2023\par Calcium 3015-8981 mg daily from diet and supplements (to be taken in divided doses as no more than 500-600 mg can be absorbed at one time)\par Continue current vitamin D regimen\par Diet, exercise and fall prevention discussed\par She has declined physical therapy for balance and bone health\par \par Hyperthyroidism. Thyroid nodules. 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 TSH receptor antibody and thyroid stimulating immunoglobulin are negative. She has been clinically and biochemically euthyroid on a low dose of methimazole. We discussed that approximately 95 percent of all thyroid nodules are caused by benign conditions. We discussed that thyroid nodules are very common and less likely to be cancer in older individuals. We discussed the risks and benefits of evaluation for thyroid cancer and morbidity of treatment if diagnosed versus overall prognosis of thyroid cancer. We will defer further ultrasound surveillance. \par Continue methimazole 2.5 mg daily pending thyroid function tests\par \par Screening for diabetes mellitus. Random glucose elevated from laboratory results in May 2022. We will send a HbA1c level.\par \par Return to see me in 6 months or earlier as needed. \par \par I reviewed the DXA performed on November 29, 2021 with the patient today.\par I reviewed the thyroid ultrasound performed from March 2022 to present with the patient today. \par I counseled the patient regarding calcium and vitamin D intake today.\par I discussed the following osteoporosis therapies: Denosumab\par \par CC:\par Dr. Debbi Montgomery, Fax [phone]

## 2023-06-14 ENCOUNTER — APPOINTMENT (OUTPATIENT)
Dept: ENDOCRINOLOGY | Facility: CLINIC | Age: 88
End: 2023-06-14
Payer: MEDICARE

## 2023-06-14 VITALS
DIASTOLIC BLOOD PRESSURE: 71 MMHG | BODY MASS INDEX: 21.97 KG/M2 | WEIGHT: 124 LBS | HEIGHT: 63 IN | SYSTOLIC BLOOD PRESSURE: 128 MMHG | HEART RATE: 71 BPM

## 2023-06-14 PROCEDURE — 99214 OFFICE O/P EST MOD 30 MIN: CPT

## 2023-06-14 NOTE — DATA REVIEWED
[FreeTextEntry1] : Laboratories (June 12, 2023) reviewed and significant for: \par Hemoglobin 10.8 g/dL (normal: 11.7-15.5); otherwise unremarkable complete blood count\par BUN/creatinine 34/1.37 mg/dL (eGFR 37 mL/min)\par TSH 1.56 uIU/mL (normal: 0.40-4.50)\par Free T4 1.0 ng/dL (normal: 0.8-1.8)\par See scanned results.\par \par Thyroid ultrasound (November 15, 2019) reviewed and significant for:\par RIGHT LOBE:\par Dimensions: 4.3 x 1.8 x 1.5 cm (sagittal x AP x transverse)\par Echotexture: homogeneous\par Vascularity: normovascular\par The right lobe contains a few nodules with largest nodule described below. Subcentimeter nodules include an upper pole 0.7 cm isoechoic solid nodule and a lower pole 0.7 cm isoechoic nodule with a complete ring of eccentric calcifications.\par \par Nodule 1:\par Location: Interpolar \par Dimensions: 1.3 x 0.7 x 1.0 cm (sagittal x AP x transverse), stable in size from prior sonography from 11/5/2018.\par Composition: Solid with cystic components.\par For solid nodules or for the solid portion of a partially cystic nodule, does the solid portion have any of the following suspicious features?\par -  No: Hypoechoic\par -  No: Microcalcifications\par -  No: Irregular margin (infiltrative or microlobulated)\par -  No: Taller than wide (AP dimension > transverse)\par -  No: Extrathyroidal extension\par -  No: Interrupted rim calcification with soft tissue extrusion\par \par LEFT LOBE:\par Dimensions: 4.4 x 1.8 x 2.1 cm (sagittal x AP x transverse)\par Echotexture: homogeneous\par Vascularity: normovascular\par The left lobe contains a few nodules, with the largest nodule described below. Subcentimeter nodules include upper pole 0.8 cm spongiform colloid cyst and a new 0.8 cm spongiform colloid cyst in the interpolar region.\par \par Nodule 1:\par Location: Interpolar/lower pole \par Dimensions: 2.3 x 1.9 x 1.9 cm (sagittal x AP x transverse), stable in size since 11/5/2018.\par Composition: Solid with cystic components\par For solid nodules or for the solid portion of a partially cystic nodule, does the solid portion have any of the following suspicious features?\par -  No: Hypoechoic\par -  No: Microcalcifications\par -  No: Irregular margin (infiltrative or microlobulated)\par -  No: Taller than wide (AP dimension > transverse)\par -  No: Extrathyroidal extension\par -  No: Interrupted rim calcification with soft tissue extrusion\par \par ISTHMUS:\par Dimensions: 0.2 cm AP\par The isthmus lobe contains no visible nodules.\par \par CERVICAL LYMPH NODE EVALUATION (for any abnormal lymph node, please note the following: location, size, calcification, cystic area, absence of central hilum, round shape, abnormal blood flow): \par -  No abnormal lymph nodes are identified in the neck.\par \par PARATHYROID EXAMINATION:\par -  Parathyroid glands not visualized.\par \par IMPRESSION: Multinodular goiter.\par Since 11/15/2019, there are stable appearance of the 1.3 cm right interpolar and 2.3 cm left interpolar/lower pole nodules. There are subcentimeter nodules are seen in the thyroid lobes bilaterally that do not meet criteria for FNA, however 12 month follow-up is recommended to determine interval changes.

## 2023-06-14 NOTE — ASSESSMENT
[FreeTextEntry1] : Osteoporosis. She has no history of fragility fracture. She has a remote history of alendronate and ibandronate therapy, zoledronic acid therapy in 2013, and most recently denosumab from September 2016 to present with three skipped doses. Her last bone density appeared overall stable at the lumbar spine, with a possible improvement at the femoral neck. We discussed the potential benefits and risks of antiresorptive osteoporosis therapy, including but not limited to osteonecrosis of the jaw and atypical femoral fracture. She is tolerating denosumab and we will continue. We discussed that denosumab must be dosed every 6 months due to rebound increase in bone breakdown with abrupt discontinuation of therapy, with transition to bisphosphonate therapy prior to a "drug holiday." \par Continue denosumab 60 mg SC every 6 months; next dose scheduled in August 2023\par Calcium 5274-9273 mg daily from diet and supplements (to be taken in divided doses as no more than 500-600 mg can be absorbed at one time); continue current regimen\par Continue current vitamin D regimen\par Diet, exercise and fall prevention discussed\par She has declined physical therapy for balance and bone health\par \par Hyperthyroidism. Thyroid nodules. 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 TSH receptor antibody and thyroid stimulating immunoglobulin are negative. She has been clinically and biochemically euthyroid on a low dose of methimazole. We discussed that approximately 95 percent of all thyroid nodules are caused by benign conditions. We discussed that thyroid nodules are very common and less likely to be cancer in older individuals. We discussed the risks and benefits of evaluation for thyroid cancer and morbidity of treatment if diagnosed versus overall prognosis of thyroid cancer. We will defer further ultrasound surveillance. \par Continue methimazole 2.5 mg daily\par \par Return to see me in 6 months or earlier as needed. \par \par I reviewed the DXA performed on November 29, 2021 with the patient today.\par I reviewed the laboratories performed on June 12, 2023 with the patient today. \par I counseled the patient regarding calcium and vitamin D intake today.\par I discussed the following osteoporosis therapies: Denosumab\par \par CC:\par Dr. Debbi Montgomery, Fax [phone]

## 2023-06-14 NOTE — PHYSICAL EXAM
[Alert] : alert [Healthy Appearance] : healthy appearance [No Acute Distress] : no acute distress [Normal Sclera/Conjunctiva] : normal sclera/conjunctiva [No Neck Mass] : no neck mass was observed [No LAD] : no lymphadenopathy [Supple] : the neck was supple [Thyroid Not Enlarged] : the thyroid was not enlarged [No Respiratory Distress] : no respiratory distress [No Spinal Tenderness] : no spinal tenderness [No Stigmata of Cushings Syndrome] : no stigmata of Cushings Syndrome [Normal Gait] : normal gait [Normal Insight/Judgement] : insight and judgment were intact [Kyphosis] : no kyphosis present [Scoliosis] : no scoliosis [Acanthosis Nigricans] : no acanthosis nigricans [de-identified] : approximately 2 cm left pole nodule, rubbery [de-identified] : no moon facies, no supraclavicular fat pads [de-identified] : + difficulty balancing on one leg bilaterally

## 2023-06-14 NOTE — HISTORY OF PRESENT ILLNESS
[FreeTextEntry1] : Ms. Cruz is an 88 year-old woman with a history of multiple medical problems including osteoporosis, atrial fibrillation, hyperthyroidism, thyroid nodules presenting for follow-up of her endocrine issues. I saw her for an initial visit in June 2020 and last in June 2022; she is a former patient of Dr. Neha Ross.\par \par Bone History\par Osteoporosis diagnosed many years ago on routine bone density (no report available); most recent bone density as below\par Fracture history: None\par Family history: No parental history of hip fracture\par Treatment: \par Remote history of alendronate and ibandronate\par Zoledronic acid 5 mg IV in December 2013\par Denosumab 60 mg SC in September 2016, February 2017, October 2017, December 2018 (spring dose skipped), December 2019 (spring dose skipped), July 2020, January 2021, missed dose in July 2021 due to administrative issues, January 2022, July 2022, January 2023\par \par Falls: None recent\par Height loss: About 2 inches\par Kidney stones: No\par Dental health: Full dentures\par Exercise: Walks daily\par Dairy intake: Rare\par Calcium supplements: 500 mg daily\par Multivitamin: None\par Vitamin D supplements: 5000 intl units daily - adjusted in May 2023 with plan to continue through October\par \par Osteoporosis risk factors include: Postmenopausal status,  race, prior fracture, falls, height loss, small thin bones, tobacco use, excessive alcohol, anorexia, family history, vitamin D deficiency, corticosteroid use, seizure medications, malabsorption, hyperparathyroidism, hyperthyroidism.\par NEGATIVE EXCEPT: Postmenopausal status, hyperthyroidism\par \par Hyperthyroidism. Thyroid nodules. \par She was diagnosed with hyperthyroidism many years ago. She has been on methimazole since at least 2016, most recently 2.5 mg daily since 2017.\par She has bilateral thyroid nodules, with a left 2.4 cm dominant nodule. \par I-123 thyroid uptake and scan in June 2018 demonstrated diffuse uptake at 51.8% not localizing to the left 2.4 cm nodule, however, TSH receptor antibody and thyroid stimulating immunoglobulin negative. \par Biopsy of the left 2.6 cm nodule benign ([place] II) in July 2018.\par No history of radiation exposure.\par No family history of thyroid disease.\par \par Interim History \par She has been taking methimazole 2.5 mg daily. She has received denosumab, last in January 2023.\par She has seen Dr. George Bustamante; note reviewed. \par Recent laboratory results ordered by her primary care provider significant for the below; see scanned results.\par She travelled to [place] in March. She is planning on travelling to [place] next year. \par No dysphagia, fixed/hard neck mass, orthopnea. No change in weight, palpitations, diarrhea/constipation, depression/anxiety. \par Medical and surgical history, medications, allergies, social and family history reviewed and updated as needed.

## 2023-08-04 ENCOUNTER — APPOINTMENT (OUTPATIENT)
Dept: RHEUMATOLOGY | Facility: CLINIC | Age: 88
End: 2023-08-04
Payer: MEDICARE

## 2023-08-04 ENCOUNTER — MED ADMIN CHARGE (OUTPATIENT)
Age: 88
End: 2023-08-04

## 2023-08-04 PROCEDURE — 96401 CHEMO ANTI-NEOPL SQ/IM: CPT

## 2023-08-04 RX ORDER — DENOSUMAB 60 MG/ML
60 INJECTION SUBCUTANEOUS
Qty: 1 | Refills: 0 | Status: COMPLETED | OUTPATIENT
Start: 2023-07-27

## 2023-10-04 LAB
24R-OH-CALCIDIOL SERPL-MCNC: 63.3 PG/ML
25(OH)D3 SERPL-MCNC: 70.2 NG/ML
ALBUMIN SERPL ELPH-MCNC: 4.5 G/DL
ALP BLD-CCNC: 63 U/L
ALT SERPL-CCNC: 12 U/L
ANION GAP SERPL CALC-SCNC: 11 MMOL/L
AST SERPL-CCNC: 20 U/L
BILIRUB SERPL-MCNC: 0.8 MG/DL
BUN SERPL-MCNC: 32 MG/DL
CALCIUM SERPL-MCNC: 9.7 MG/DL
CALCIUM SERPL-MCNC: 9.7 MG/DL
CHLORIDE SERPL-SCNC: 100 MMOL/L
CO2 SERPL-SCNC: 27 MMOL/L
CREAT SERPL-MCNC: 1.37 MG/DL
CREAT SPEC-SCNC: 53 MG/DL
EGFR: 37 ML/MIN/1.73M2
GLUCOSE SERPL-MCNC: 102 MG/DL
MAGNESIUM SERPL-MCNC: 2 MG/DL
MICROALBUMIN 24H UR DL<=1MG/L-MCNC: 3.5 MG/DL
MICROALBUMIN/CREAT 24H UR-RTO: 65 MG/G
PARATHYROID HORMONE INTACT: 128 PG/ML
PHOSPHATE SERPL-MCNC: 3.7 MG/DL
POTASSIUM SERPL-SCNC: 4.5 MMOL/L
PROT SERPL-MCNC: 7.4 G/DL
SODIUM SERPL-SCNC: 139 MMOL/L
URATE SERPL-MCNC: 7.1 MG/DL

## 2023-10-09 ENCOUNTER — APPOINTMENT (OUTPATIENT)
Dept: NEPHROLOGY | Facility: CLINIC | Age: 88
End: 2023-10-09
Payer: MEDICARE

## 2023-10-09 VITALS
BODY MASS INDEX: 22.14 KG/M2 | WEIGHT: 125 LBS | SYSTOLIC BLOOD PRESSURE: 141 MMHG | DIASTOLIC BLOOD PRESSURE: 88 MMHG | HEART RATE: 75 BPM

## 2023-10-09 VITALS — SYSTOLIC BLOOD PRESSURE: 148 MMHG | DIASTOLIC BLOOD PRESSURE: 79 MMHG

## 2023-10-09 PROCEDURE — 99214 OFFICE O/P EST MOD 30 MIN: CPT

## 2023-10-09 RX ORDER — SIMVASTATIN 40 MG/1
40 TABLET, FILM COATED ORAL
Qty: 90 | Refills: 3 | Status: ACTIVE | COMMUNITY
Start: 2023-10-09

## 2023-12-04 ENCOUNTER — APPOINTMENT (OUTPATIENT)
Dept: RADIOLOGY | Facility: CLINIC | Age: 88
End: 2023-12-04
Payer: MEDICARE

## 2023-12-04 ENCOUNTER — OUTPATIENT (OUTPATIENT)
Dept: OUTPATIENT SERVICES | Facility: HOSPITAL | Age: 88
LOS: 1 days | End: 2023-12-04

## 2023-12-04 PROCEDURE — 77085 DXA BONE DENSITY AXL VRT FX: CPT | Mod: 26

## 2023-12-13 ENCOUNTER — APPOINTMENT (OUTPATIENT)
Dept: ENDOCRINOLOGY | Facility: CLINIC | Age: 88
End: 2023-12-13
Payer: MEDICARE

## 2023-12-13 VITALS
HEART RATE: 71 BPM | SYSTOLIC BLOOD PRESSURE: 133 MMHG | WEIGHT: 128 LBS | DIASTOLIC BLOOD PRESSURE: 71 MMHG | BODY MASS INDEX: 22.67 KG/M2

## 2023-12-13 DIAGNOSIS — E05.20 THYROTOXICOSIS WITH TOXIC MULTINODULAR GOITER W/OUT THYROTOXIC CRISIS OR STORM: ICD-10-CM

## 2023-12-13 DIAGNOSIS — E05.90 THYROTOXICOSIS, UNSPECIFIED W/OUT THYROTOXIC CRISIS OR STORM: ICD-10-CM

## 2023-12-13 PROCEDURE — 99214 OFFICE O/P EST MOD 30 MIN: CPT

## 2023-12-13 NOTE — HISTORY OF PRESENT ILLNESS
[FreeTextEntry1] : Ms. Cruz is an 89 year-old woman with a history of multiple medical problems including osteoporosis, atrial fibrillation, hyperthyroidism, thyroid nodules presenting for follow-up of her endocrine issues. I saw her for an initial visit in June 2020 and last in June 2023.  Bone History Osteoporosis diagnosed many years ago on routine bone density (no report available); most recent bone density as below Fracture history: None Family history: No parental history of hip fracture Treatment:  Remote history of alendronate and ibandronate Zoledronic acid 5 mg IV in December 2013 Denosumab 60 mg SC in September 2016, February 2017, October 2017, December 2018 (spring dose skipped), December 2019 (spring dose skipped), July 2020, January 2021, missed dose in July 2021 due to administrative issues, January 2022, July 2022, January 2023, August 2023  Falls: None recent Height loss: About 2 inches Kidney stones: No Dental health: Full dentures Exercise: Walks daily Dairy intake: Rare Calcium supplements: 500 mg daily Multivitamin: None Vitamin D supplements: 5000 intl units daily  Osteoporosis risk factors include: Postmenopausal status,  race, prior fracture, falls, height loss, small thin bones, tobacco use, excessive alcohol, anorexia, family history, vitamin D deficiency, corticosteroid use, seizure medications, malabsorption, hyperparathyroidism, hyperthyroidism. NEGATIVE EXCEPT: Postmenopausal status, hyperthyroidism  Hyperthyroidism. Thyroid nodules.  She was diagnosed with hyperthyroidism many years ago. She has been on methimazole since at least 2016, most recently 2.5 mg daily since 2017. She has bilateral thyroid nodules, with a left 2.4 cm dominant nodule.  I-123 thyroid uptake and scan in June 2018 demonstrated diffuse uptake at 51.8% not localizing to the left 2.4 cm nodule, however, TSH receptor antibody and thyroid stimulating immunoglobulin negative.  Biopsy of the left 2.6 cm nodule benign ([place] II) in July 2018. No history of radiation exposure. No family history of thyroid disease.  Interim History  She has been taking methimazole 2.5 mg daily for hyperthyroidism.  She has received denosumab, last in August 2023. Recent bone density as below. There is osteoporosis by the World Health Organization criteria. While not directly comparable, there were apparent improvements at the lumbar spine and total hip from previous. Vertebral fracture assessment without evidence of compression fractures. She has seen Dr. George Bustamante; note reviewed. Last laboratory results reviewed. Serum calcium within range. eGFR overall stable. Vitamin D robust.  Her niece is involved in a medical program in [place]. She is planning on travelling to [place] next year.  No dysphagia, fixed/hard neck mass, orthopnea. No change in weight, palpitations, diarrhea/constipation, depression/anxiety.  Medical and surgical history, medications, allergies, social and family history reviewed and updated as needed.

## 2023-12-13 NOTE — PHYSICAL EXAM
[Alert] : alert [Healthy Appearance] : healthy appearance [No Acute Distress] : no acute distress [Normal Sclera/Conjunctiva] : normal sclera/conjunctiva [No Neck Mass] : no neck mass was observed [No LAD] : no lymphadenopathy [Supple] : the neck was supple [Thyroid Not Enlarged] : the thyroid was not enlarged [No Respiratory Distress] : no respiratory distress [No Spinal Tenderness] : no spinal tenderness [Kyphosis] : no kyphosis present [Scoliosis] : no scoliosis [No Stigmata of Cushings Syndrome] : no stigmata of Cushings Syndrome [Normal Gait] : normal gait [Acanthosis Nigricans] : no acanthosis nigricans [Normal Insight/Judgement] : insight and judgment were intact [de-identified] : approximately 2 cm left pole nodule, rubbery [de-identified] : no moon facies, no supraclavicular fat pads

## 2023-12-13 NOTE — ASSESSMENT
[FreeTextEntry1] : Osteoporosis. She has no history of fragility fracture. She has a remote history of alendronate and ibandronate therapy, zoledronic acid therapy in 2013, and most recently denosumab from September 2016 to present with three skipped doses. Her most recent bone density, while not directly comparable, demonstrated apparent improvements at the lumbar spine and total hip from previous. We have discussed the potential benefits and risks of antiresorptive osteoporosis therapy, including but not limited to osteonecrosis of the jaw and atypical femoral fracture. She is tolerating denosumab and we will continue. We have discussed that denosumab must be dosed every 6 months due to rebound increase in bone breakdown with abrupt discontinuation of therapy, with transition to bisphosphonate therapy prior to a "drug holiday." Continue denosumab 60 mg SC every 6 months; next dose scheduled in February 2024 Calcium 7275-5956 mg daily from diet and supplements (to be taken in divided doses as no more than 500-600 mg can be absorbed at one time); continue current regimen Maintain 25-hydroxyvitamin D >30 ng/mL Diet, exercise and fall prevention discussed She has declined physical therapy for balance and bone health  Hyperthyroidism. Thyroid nodules. 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 TSH receptor antibody and thyroid stimulating immunoglobulin are negative. She has been clinically and biochemically euthyroid on a low dose of methimazole. We discussed that approximately 95 percent of all thyroid nodules are caused by benign conditions. We discussed that thyroid nodules are very common and less likely to be cancer in older individuals. We discussed the risks and benefits of evaluation for thyroid cancer and morbidity of treatment if diagnosed versus overall prognosis of thyroid cancer. We will defer further ultrasound surveillance. Continue methimazole 2.5 mg daily pending thyroid function tests  Return to see me in 6 months or earlier as needed.  I reviewed the DXA performed on December 4, 2023 with the patient today. I reviewed the laboratories performed on October 2, 2023 with the patient today. I counseled the patient regarding calcium and vitamin D intake today. I discussed the following osteoporosis therapies: Denosumab  CC: Dr. Debbi Montgomery, Fax [phone]

## 2023-12-13 NOTE — DATA REVIEWED
[FreeTextEntry1] : Laboratories (June 12, 2023) reviewed and significant for:  Hemoglobin 10.8 g/dL (normal: 11.7-15.5); otherwise unremarkable complete blood count BUN/creatinine 34/1.37 mg/dL (eGFR 37 mL/min) TSH 1.56 uIU/mL (normal: 0.40-4.50) Free T4 1.0 ng/dL (normal: 0.8-1.8) See scanned results.  Thyroid ultrasound (November 15, 2019) reviewed and significant for: RIGHT LOBE: Dimensions: 4.3 x 1.8 x 1.5 cm (sagittal x AP x transverse) Echotexture: homogeneous Vascularity: normovascular The right lobe contains a few nodules with largest nodule described below. Subcentimeter nodules include an upper pole 0.7 cm isoechoic solid nodule and a lower pole 0.7 cm isoechoic nodule with a complete ring of eccentric calcifications.  Nodule 1: Location: Interpolar  Dimensions: 1.3 x 0.7 x 1.0 cm (sagittal x AP x transverse), stable in size from prior sonography from 11/5/2018. Composition: Solid with cystic components. For solid nodules or for the solid portion of a partially cystic nodule, does the solid portion have any of the following suspicious features? -  No: Hypoechoic -  No: Microcalcifications -  No: Irregular margin (infiltrative or microlobulated) -  No: Taller than wide (AP dimension > transverse) -  No: Extrathyroidal extension -  No: Interrupted rim calcification with soft tissue extrusion  LEFT LOBE: Dimensions: 4.4 x 1.8 x 2.1 cm (sagittal x AP x transverse) Echotexture: homogeneous Vascularity: normovascular The left lobe contains a few nodules, with the largest nodule described below. Subcentimeter nodules include upper pole 0.8 cm spongiform colloid cyst and a new 0.8 cm spongiform colloid cyst in the interpolar region.  Nodule 1: Location: Interpolar/lower pole  Dimensions: 2.3 x 1.9 x 1.9 cm (sagittal x AP x transverse), stable in size since 11/5/2018. Composition: Solid with cystic components For solid nodules or for the solid portion of a partially cystic nodule, does the solid portion have any of the following suspicious features? -  No: Hypoechoic -  No: Microcalcifications -  No: Irregular margin (infiltrative or microlobulated) -  No: Taller than wide (AP dimension > transverse) -  No: Extrathyroidal extension -  No: Interrupted rim calcification with soft tissue extrusion  ISTHMUS: Dimensions: 0.2 cm AP The isthmus lobe contains no visible nodules.  CERVICAL LYMPH NODE EVALUATION (for any abnormal lymph node, please note the following: location, size, calcification, cystic area, absence of central hilum, round shape, abnormal blood flow):  -  No abnormal lymph nodes are identified in the neck.  PARATHYROID EXAMINATION: -  Parathyroid glands not visualized.  IMPRESSION: Multinodular goiter. Since 11/15/2019, there are stable appearance of the 1.3 cm right interpolar and 2.3 cm left interpolar/lower pole nodules. There are subcentimeter nodules are seen in the thyroid lobes bilaterally that do not meet criteria for FNA, however 12 month follow-up is recommended to determine interval changes.  Most recent bone mineral density Date: December 4, 2023 Source: Revegy Site: [facility]  Site	BMD	T-score	Change previous	Change baseline	 Lumbar spine	0.666	-3.5		+4.2%#	 Femoral neck	0.586	-2.4		+0.8%#	 Total hip           0.766	-1.4		+7.3%#	 Distal radius	0.578	-1.9		+1.6%#	 DXA comments: #Dissimilar scan analysis; left hip values Vertebral fracture assessment without evidence of compression fractures T8 to L5 (my read)  Impression: I have personally reviewed the DXA images and report, and I compared these images to a DXA dated November 19, 2021 from [facility]. There is osteoporosis by the World Health Organization criteria. While not directly comparable, there were apparent improvements at the lumbar spine and total hip from previous. Vertebral fracture assessment without evidence of compression fractures.

## 2024-02-05 ENCOUNTER — APPOINTMENT (OUTPATIENT)
Dept: RHEUMATOLOGY | Facility: CLINIC | Age: 89
End: 2024-02-05
Payer: MEDICARE

## 2024-02-05 ENCOUNTER — MED ADMIN CHARGE (OUTPATIENT)
Age: 89
End: 2024-02-05

## 2024-02-05 VITALS
RESPIRATION RATE: 14 BRPM | DIASTOLIC BLOOD PRESSURE: 89 MMHG | OXYGEN SATURATION: 97 % | SYSTOLIC BLOOD PRESSURE: 157 MMHG | HEART RATE: 71 BPM | TEMPERATURE: 96 F

## 2024-02-05 PROCEDURE — 96401 CHEMO ANTI-NEOPL SQ/IM: CPT

## 2024-02-05 RX ORDER — DENOSUMAB 60 MG/ML
60 INJECTION SUBCUTANEOUS
Qty: 1 | Refills: 0 | Status: COMPLETED | OUTPATIENT
Start: 2024-02-01

## 2024-03-04 LAB
25(OH)D3 SERPL-MCNC: 56.2 NG/ML
ALBUMIN SERPL ELPH-MCNC: 4.6 G/DL
ALP BLD-CCNC: 56 U/L
ALT SERPL-CCNC: 10 U/L
ANION GAP SERPL CALC-SCNC: 12 MMOL/L
AST SERPL-CCNC: 18 U/L
BILIRUB SERPL-MCNC: 0.4 MG/DL
BUN SERPL-MCNC: 24 MG/DL
CALCIUM SERPL-MCNC: 9.5 MG/DL
CALCIUM SERPL-MCNC: 9.5 MG/DL
CHLORIDE SERPL-SCNC: 102 MMOL/L
CO2 SERPL-SCNC: 26 MMOL/L
CREAT SERPL-MCNC: 1.35 MG/DL
CREAT SPEC-SCNC: 81 MG/DL
EGFR: 38 ML/MIN/1.73M2
GLUCOSE SERPL-MCNC: 73 MG/DL
HCT VFR BLD CALC: 36.3 %
HGB BLD-MCNC: 10.9 G/DL
MAGNESIUM SERPL-MCNC: 1.7 MG/DL
MCHC RBC-ENTMCNC: 30 GM/DL
MCHC RBC-ENTMCNC: 30.3 PG
MCV RBC AUTO: 100.8 FL
MICROALBUMIN 24H UR DL<=1MG/L-MCNC: 10.3 MG/DL
MICROALBUMIN/CREAT 24H UR-RTO: 126 MG/G
PARATHYROID HORMONE INTACT: 165 PG/ML
PHOSPHATE SERPL-MCNC: 4 MG/DL
PLATELET # BLD AUTO: 188 K/UL
POTASSIUM SERPL-SCNC: 4 MMOL/L
PROT SERPL-MCNC: 7 G/DL
RBC # BLD: 3.6 M/UL
RBC # FLD: 12.3 %
SODIUM SERPL-SCNC: 140 MMOL/L
URATE SERPL-MCNC: 7.3 MG/DL
WBC # FLD AUTO: 4.53 K/UL

## 2024-03-18 ENCOUNTER — APPOINTMENT (OUTPATIENT)
Dept: NEPHROLOGY | Facility: CLINIC | Age: 89
End: 2024-03-18
Payer: MEDICARE

## 2024-03-18 VITALS
SYSTOLIC BLOOD PRESSURE: 148 MMHG | DIASTOLIC BLOOD PRESSURE: 76 MMHG | BODY MASS INDEX: 22.67 KG/M2 | WEIGHT: 128 LBS | HEART RATE: 76 BPM

## 2024-03-18 DIAGNOSIS — I10 ESSENTIAL (PRIMARY) HYPERTENSION: ICD-10-CM

## 2024-03-18 DIAGNOSIS — N18.30 CHRONIC KIDNEY DISEASE, STAGE 3 UNSPECIFIED: ICD-10-CM

## 2024-03-18 DIAGNOSIS — E21.3 HYPERPARATHYROIDISM, UNSPECIFIED: ICD-10-CM

## 2024-03-18 DIAGNOSIS — R80.9 PROTEINURIA, UNSPECIFIED: ICD-10-CM

## 2024-03-18 PROCEDURE — G2211 COMPLEX E/M VISIT ADD ON: CPT

## 2024-03-18 PROCEDURE — 99214 OFFICE O/P EST MOD 30 MIN: CPT

## 2024-03-18 RX ORDER — MULTIVIT-MIN/FOLIC/VIT K/LYCOP 400-300MCG
50 MCG TABLET ORAL DAILY
Qty: 180 | Refills: 0 | Status: ACTIVE | COMMUNITY
Start: 2022-12-05

## 2024-03-18 NOTE — HISTORY OF PRESENT ILLNESS
[FreeTextEntry1] : f/u CKD  no complaints meds reviewed with pt - on higher dose vit D 4000IU/d Not checking BP at home had Prolia injection in Feb  labs done and reviewed - see below PCP Dr Montgomery

## 2024-03-18 NOTE — PHYSICAL EXAM
[General Appearance - Alert] : alert [General Appearance - In No Acute Distress] : in no acute distress [Sclera] : the sclera and conjunctiva were normal [Auscultation Breath Sounds / Voice Sounds] : lungs were clear to auscultation bilaterally [Jugular Venous Distention Increased] : there was no jugular-venous distention [Heart Rate And Rhythm] : heart rate was normal and rhythm regular [Heart Sounds] : normal S1 and S2 [Edema] : there was no peripheral edema [Abdomen Tenderness] : non-tender [Abdomen Soft] : soft [No CVA Tenderness] : no ~M costovertebral angle tenderness [Involuntary Movements] : no involuntary movements were seen [] : no rash [No Focal Deficits] : no focal deficits [Oriented To Time, Place, And Person] : oriented to person, place, and time [Affect] : the affect was normal

## 2024-03-18 NOTE — ASSESSMENT
[FreeTextEntry1] : CKD 3 stable Lytes good microalbuminuria slt up -- on ACEI  BP borderline cont lisinopril and  HCTZ  advised follow home BP   consider add low dose Ca channel blocker if BP remains high 140s to 150 systolic  will also consider SGLT2i if albuminuria increases on f.u  PTH slt up -- may be die to denosumab -- cont higher dose vit D and follow f/u 6 mos

## 2024-05-02 ENCOUNTER — APPOINTMENT (OUTPATIENT)
Dept: RHEUMATOLOGY | Facility: CLINIC | Age: 89
End: 2024-05-02

## 2024-05-09 ENCOUNTER — APPOINTMENT (OUTPATIENT)
Dept: RHEUMATOLOGY | Facility: CLINIC | Age: 89
End: 2024-05-09
Payer: MEDICARE

## 2024-05-09 VITALS
TEMPERATURE: 98.2 F | DIASTOLIC BLOOD PRESSURE: 94 MMHG | OXYGEN SATURATION: 96 % | SYSTOLIC BLOOD PRESSURE: 146 MMHG | HEIGHT: 63 IN | BODY MASS INDEX: 22.32 KG/M2 | HEART RATE: 78 BPM | WEIGHT: 126 LBS

## 2024-05-09 DIAGNOSIS — M81.0 AGE-RELATED OSTEOPOROSIS W/OUT CURRENT PATHOLOGICAL FRACTURE: ICD-10-CM

## 2024-05-09 PROCEDURE — 99203 OFFICE O/P NEW LOW 30 MIN: CPT

## 2024-05-09 PROCEDURE — 36415 COLL VENOUS BLD VENIPUNCTURE: CPT

## 2024-05-12 NOTE — HISTORY OF PRESENT ILLNESS
[FreeTextEntry1] : 89-year-old woman with history of osteoporosis referred for rheumatology evaluation  Patient with a history of osteoporosis followed by endocrinology As per endocrinology note, patient was diagnosed with osteoporosis many years ago on a routine bone density.  No history of fracture noted by patient. Previous remote history of alendronate and ibandronate therapy. Treated with 1 dose of zoledronic acid in 2013 Patient was started on denosumab in September 2016, continues at this time, as per endocrinology note may have missed about 3 doses during this time period, most recent dose in February 2024, next dose due in August 2024. Had a recent fall on the bus, no fracture, was evaluated following fall. No history of kidney stones Takes calcium and vitamin D supplements, dosing as per nephrologist. Patient also with history of hyperthyroidism, treated with methimazole. Patient with dentures, no jaw pain.  Very active, walks on regular basis, recent travel to [place] on tour with family. Blood pressure elevated today, did not take medications prior to visit.  DEXA  June 2023 Left hip T-score -1.4 Left femoral neck T-score -2.4 Spine T-score -3.5  DEXA November 2021 Left hip T-score -1.9 Left femoral neck T-score -2.3 Spine T-score -3.7

## 2024-05-12 NOTE — ASSESSMENT
[FreeTextEntry1] : 89 year old woman with history of osteoporosis for rheumatology evaluation.  Patient with history of osteoporosis, following closely with endocrinologist, doing well on denosumab 60 mg every six months. Patient has a remote history of alendronate and ibandronate therapy, zoledronic acid therapy in 2013, and most recently denosumab from September 2016 to present with three skipped doses. Discussed risks and benefits of densoumab and discussed that denosumab must be dosed every 6 months to prevent rebound increase in bone resorption with abrupt discontinuation of therapy, with transition to bisphosphonate therapy prior to discontinuation. Will update BMP today in office. Continue denosumab 60 mg SC every 6 months; next dose scheduled in August 2024, patient will continue to follow up with endocrinologist.

## 2024-05-12 NOTE — DATA REVIEWED
[FreeTextEntry1] : DEXA  June 2023 Left hip T-score -1.4 Left femoral neck T-score -2.4 Spine T-score -3.5  DEXA November 2021 Left hip T-score -1.9 Left femoral neck T-score -2.3 Spine T-score -3.7

## 2024-05-12 NOTE — PHYSICAL EXAM
[General Appearance - Alert] : alert [General Appearance - In No Acute Distress] : in no acute distress [General Appearance - Well Nourished] : well nourished [General Appearance - Well Developed] : well developed [Sclera] : the sclera and conjunctiva were normal [] : no respiratory distress [Respiration, Rhythm And Depth] : normal respiratory rhythm and effort [Exaggerated Use Of Accessory Muscles For Inspiration] : no accessory muscle use [Auscultation Breath Sounds / Voice Sounds] : lungs were clear to auscultation bilaterally [Heart Rate And Rhythm] : heart rate was normal and rhythm regular [Heart Sounds] : normal S1 and S2 [No Spinal Tenderness] : no spinal tenderness [Abnormal Walk] : normal gait [Nail Clubbing] : no clubbing  or cyanosis of the fingernails [Musculoskeletal - Swelling] : no joint swelling seen [Motor Tone] : muscle strength and tone were normal [Affect] : the affect was normal [Mood] : the mood was normal [FreeTextEntry1] : No active synovitis of the upper and lower extremities bilaterally.

## 2024-05-14 LAB
ANION GAP SERPL CALC-SCNC: 13 MMOL/L
BUN SERPL-MCNC: 22 MG/DL
CALCIUM SERPL-MCNC: 9.6 MG/DL
CHLORIDE SERPL-SCNC: 102 MMOL/L
CO2 SERPL-SCNC: 26 MMOL/L
CREAT SERPL-MCNC: 1.53 MG/DL
EGFR: 32 ML/MIN/1.73M2
GLUCOSE SERPL-MCNC: 100 MG/DL
POTASSIUM SERPL-SCNC: 3.6 MMOL/L
SODIUM SERPL-SCNC: 141 MMOL/L

## 2024-06-04 ENCOUNTER — RX RENEWAL (OUTPATIENT)
Age: 89
End: 2024-06-04

## 2024-06-04 RX ORDER — HYDROCHLOROTHIAZIDE 25 MG/1
25 TABLET ORAL
Qty: 90 | Refills: 3 | Status: ACTIVE | COMMUNITY
Start: 2022-11-07 | End: 1900-01-01

## 2024-07-11 ENCOUNTER — APPOINTMENT (OUTPATIENT)
Dept: ENDOCRINOLOGY | Facility: CLINIC | Age: 89
End: 2024-07-11
Payer: MEDICARE

## 2024-07-11 VITALS
WEIGHT: 120 LBS | BODY MASS INDEX: 21.26 KG/M2 | SYSTOLIC BLOOD PRESSURE: 123 MMHG | DIASTOLIC BLOOD PRESSURE: 79 MMHG | HEART RATE: 64 BPM

## 2024-07-11 DIAGNOSIS — E05.20 THYROTOXICOSIS WITH TOXIC MULTINODULAR GOITER W/OUT THYROTOXIC CRISIS OR STORM: ICD-10-CM

## 2024-07-11 DIAGNOSIS — M81.0 AGE-RELATED OSTEOPOROSIS W/OUT CURRENT PATHOLOGICAL FRACTURE: ICD-10-CM

## 2024-07-11 PROCEDURE — 99214 OFFICE O/P EST MOD 30 MIN: CPT

## 2024-07-11 PROCEDURE — G2211 COMPLEX E/M VISIT ADD ON: CPT

## 2024-08-09 ENCOUNTER — NON-APPOINTMENT (OUTPATIENT)
Age: 89
End: 2024-08-09

## 2024-08-14 ENCOUNTER — MED ADMIN CHARGE (OUTPATIENT)
Age: 89
End: 2024-08-14

## 2024-08-14 ENCOUNTER — APPOINTMENT (OUTPATIENT)
Dept: RHEUMATOLOGY | Facility: CLINIC | Age: 89
End: 2024-08-14
Payer: MEDICARE

## 2024-08-14 VITALS
DIASTOLIC BLOOD PRESSURE: 78 MMHG | OXYGEN SATURATION: 97 % | RESPIRATION RATE: 14 BRPM | HEART RATE: 69 BPM | TEMPERATURE: 97.5 F | SYSTOLIC BLOOD PRESSURE: 134 MMHG

## 2024-08-14 DIAGNOSIS — M81.0 AGE-RELATED OSTEOPOROSIS W/OUT CURRENT PATHOLOGICAL FRACTURE: ICD-10-CM

## 2024-08-14 PROCEDURE — 96401 CHEMO ANTI-NEOPL SQ/IM: CPT

## 2024-09-16 ENCOUNTER — APPOINTMENT (OUTPATIENT)
Dept: NEPHROLOGY | Facility: CLINIC | Age: 89
End: 2024-09-16
Payer: MEDICARE

## 2024-09-16 VITALS — DIASTOLIC BLOOD PRESSURE: 97 MMHG | SYSTOLIC BLOOD PRESSURE: 151 MMHG

## 2024-09-16 VITALS
DIASTOLIC BLOOD PRESSURE: 96 MMHG | SYSTOLIC BLOOD PRESSURE: 156 MMHG | BODY MASS INDEX: 22.14 KG/M2 | WEIGHT: 125 LBS | HEART RATE: 73 BPM

## 2024-09-16 VITALS — SYSTOLIC BLOOD PRESSURE: 163 MMHG | DIASTOLIC BLOOD PRESSURE: 91 MMHG

## 2024-09-16 DIAGNOSIS — R80.9 PROTEINURIA, UNSPECIFIED: ICD-10-CM

## 2024-09-16 DIAGNOSIS — E21.3 HYPERPARATHYROIDISM, UNSPECIFIED: ICD-10-CM

## 2024-09-16 DIAGNOSIS — I10 ESSENTIAL (PRIMARY) HYPERTENSION: ICD-10-CM

## 2024-09-16 DIAGNOSIS — N18.30 CHRONIC KIDNEY DISEASE, STAGE 3 UNSPECIFIED: ICD-10-CM

## 2024-09-16 PROCEDURE — G2211 COMPLEX E/M VISIT ADD ON: CPT

## 2024-09-16 PROCEDURE — 99214 OFFICE O/P EST MOD 30 MIN: CPT

## 2024-09-16 NOTE — ASSESSMENT
[FreeTextEntry1] : CKD 3 stable to improved fxn  microalbuminuria off ACEI  PTH up -- with nl calcium - seems may be due to denosumab -- vit D replete -- cont vitamin D and endo f/u  BP rather high today on mult checks though says has been good both office visits and at local pharmacy  will rena Montgomery if restart lower dose ACEI or ARB for HTN and albuminuria-- consider 24 hour ABPM if ongoing discrepancy  f/u 3-4 mos

## 2024-09-16 NOTE — PHYSICAL EXAM
[General Appearance - Alert] : alert [General Appearance - In No Acute Distress] : in no acute distress [Sclera] : the sclera and conjunctiva were normal [Jugular Venous Distention Increased] : there was no jugular-venous distention [Auscultation Breath Sounds / Voice Sounds] : lungs were clear to auscultation bilaterally [Heart Rate And Rhythm] : heart rate was normal and rhythm regular [Heart Sounds] : normal S1 and S2 [Edema] : there was no peripheral edema [Abdomen Soft] : soft [Abdomen Tenderness] : non-tender [No CVA Tenderness] : no ~M costovertebral angle tenderness [] : no rash [No Focal Deficits] : no focal deficits [Oriented To Time, Place, And Person] : oriented to person, place, and time [Affect] : the affect was normal

## 2024-09-16 NOTE — HISTORY OF PRESENT ILLNESS
[FreeTextEntry1] : f/u CKD reports taken off lisinopril s/p admit in April in [place] BP has been good since then on HCTZ alone for BP  -- 120s systolic in pharmacy where checks and good with doctors  s/p Prolia injection in August labs done and reviewed - see below meds reviewed with pt and list updated PCP Dr Montgomery

## 2025-01-16 ENCOUNTER — APPOINTMENT (OUTPATIENT)
Dept: ENDOCRINOLOGY | Facility: CLINIC | Age: 89
End: 2025-01-16
Payer: MEDICARE

## 2025-01-16 VITALS
WEIGHT: 123 LBS | DIASTOLIC BLOOD PRESSURE: 95 MMHG | HEART RATE: 73 BPM | BODY MASS INDEX: 21.79 KG/M2 | SYSTOLIC BLOOD PRESSURE: 136 MMHG

## 2025-01-16 DIAGNOSIS — E05.20 THYROTOXICOSIS WITH TOXIC MULTINODULAR GOITER W/OUT THYROTOXIC CRISIS OR STORM: ICD-10-CM

## 2025-01-16 DIAGNOSIS — M81.0 AGE-RELATED OSTEOPOROSIS W/OUT CURRENT PATHOLOGICAL FRACTURE: ICD-10-CM

## 2025-01-16 PROCEDURE — 36415 COLL VENOUS BLD VENIPUNCTURE: CPT

## 2025-01-16 PROCEDURE — G2211 COMPLEX E/M VISIT ADD ON: CPT

## 2025-01-16 PROCEDURE — 99214 OFFICE O/P EST MOD 30 MIN: CPT

## 2025-02-24 ENCOUNTER — MED ADMIN CHARGE (OUTPATIENT)
Age: 89
End: 2025-02-24

## 2025-02-24 ENCOUNTER — APPOINTMENT (OUTPATIENT)
Dept: RHEUMATOLOGY | Facility: CLINIC | Age: 89
End: 2025-02-24
Payer: MEDICARE

## 2025-02-24 VITALS
DIASTOLIC BLOOD PRESSURE: 85 MMHG | HEART RATE: 80 BPM | OXYGEN SATURATION: 97 % | TEMPERATURE: 97 F | RESPIRATION RATE: 14 BRPM | SYSTOLIC BLOOD PRESSURE: 151 MMHG

## 2025-02-24 DIAGNOSIS — M81.0 AGE-RELATED OSTEOPOROSIS W/OUT CURRENT PATHOLOGICAL FRACTURE: ICD-10-CM

## 2025-02-24 PROCEDURE — 96401 CHEMO ANTI-NEOPL SQ/IM: CPT

## 2025-03-25 ENCOUNTER — APPOINTMENT (OUTPATIENT)
Dept: NEPHROLOGY | Facility: CLINIC | Age: 89
End: 2025-03-25
Payer: MEDICARE

## 2025-03-25 VITALS — DIASTOLIC BLOOD PRESSURE: 81 MMHG | SYSTOLIC BLOOD PRESSURE: 151 MMHG

## 2025-03-25 VITALS
SYSTOLIC BLOOD PRESSURE: 147 MMHG | BODY MASS INDEX: 21.26 KG/M2 | WEIGHT: 120 LBS | DIASTOLIC BLOOD PRESSURE: 87 MMHG | HEART RATE: 89 BPM

## 2025-03-25 DIAGNOSIS — N18.30 CHRONIC KIDNEY DISEASE, STAGE 3 UNSPECIFIED: ICD-10-CM

## 2025-03-25 DIAGNOSIS — I10 ESSENTIAL (PRIMARY) HYPERTENSION: ICD-10-CM

## 2025-03-25 PROCEDURE — 99214 OFFICE O/P EST MOD 30 MIN: CPT

## 2025-03-25 RX ORDER — AMLODIPINE BESYLATE 2.5 MG/1
2.5 TABLET ORAL DAILY
Qty: 90 | Refills: 0 | Status: ACTIVE | COMMUNITY
Start: 2025-03-25 | End: 1900-01-01

## 2025-05-28 ENCOUNTER — APPOINTMENT (OUTPATIENT)
Dept: ENDOCRINOLOGY | Facility: CLINIC | Age: 89
End: 2025-05-28
Payer: MEDICARE

## 2025-05-28 VITALS
HEIGHT: 63 IN | SYSTOLIC BLOOD PRESSURE: 138 MMHG | DIASTOLIC BLOOD PRESSURE: 78 MMHG | BODY MASS INDEX: 21.97 KG/M2 | HEART RATE: 70 BPM | WEIGHT: 124 LBS

## 2025-05-28 DIAGNOSIS — M81.0 AGE-RELATED OSTEOPOROSIS W/OUT CURRENT PATHOLOGICAL FRACTURE: ICD-10-CM

## 2025-05-28 DIAGNOSIS — E05.20 THYROTOXICOSIS WITH TOXIC MULTINODULAR GOITER W/OUT THYROTOXIC CRISIS OR STORM: ICD-10-CM

## 2025-05-28 PROCEDURE — 99214 OFFICE O/P EST MOD 30 MIN: CPT

## 2025-05-28 PROCEDURE — G2211 COMPLEX E/M VISIT ADD ON: CPT

## 2025-07-02 ENCOUNTER — NON-APPOINTMENT (OUTPATIENT)
Age: 89
End: 2025-07-02

## 2025-07-25 ENCOUNTER — APPOINTMENT (OUTPATIENT)
Dept: ENDOCRINOLOGY | Facility: CLINIC | Age: 89
End: 2025-07-25

## 2025-08-04 ENCOUNTER — APPOINTMENT (OUTPATIENT)
Dept: NEPHROLOGY | Facility: CLINIC | Age: 89
End: 2025-08-04
Payer: MEDICARE

## 2025-08-04 VITALS
SYSTOLIC BLOOD PRESSURE: 135 MMHG | WEIGHT: 125 LBS | BODY MASS INDEX: 22.14 KG/M2 | DIASTOLIC BLOOD PRESSURE: 82 MMHG | HEART RATE: 76 BPM

## 2025-08-04 DIAGNOSIS — I10 ESSENTIAL (PRIMARY) HYPERTENSION: ICD-10-CM

## 2025-08-04 DIAGNOSIS — N18.30 CHRONIC KIDNEY DISEASE, STAGE 3 UNSPECIFIED: ICD-10-CM

## 2025-08-04 PROCEDURE — 99214 OFFICE O/P EST MOD 30 MIN: CPT

## 2025-08-04 PROCEDURE — G2211 COMPLEX E/M VISIT ADD ON: CPT

## 2025-08-25 ENCOUNTER — APPOINTMENT (OUTPATIENT)
Dept: RHEUMATOLOGY | Facility: CLINIC | Age: 89
End: 2025-08-25
Payer: MEDICARE

## 2025-08-25 ENCOUNTER — MED ADMIN CHARGE (OUTPATIENT)
Age: 89
End: 2025-08-25

## 2025-08-25 VITALS
RESPIRATION RATE: 15 BRPM | SYSTOLIC BLOOD PRESSURE: 153 MMHG | OXYGEN SATURATION: 96 % | DIASTOLIC BLOOD PRESSURE: 80 MMHG | HEART RATE: 70 BPM | TEMPERATURE: 98.1 F

## 2025-08-25 DIAGNOSIS — M81.0 AGE-RELATED OSTEOPOROSIS W/OUT CURRENT PATHOLOGICAL FRACTURE: ICD-10-CM

## 2025-08-25 PROCEDURE — 96401 CHEMO ANTI-NEOPL SQ/IM: CPT

## 2025-08-25 RX ORDER — DENOSUMAB 60 MG/ML
60 INJECTION SUBCUTANEOUS
Qty: 1 | Refills: 0 | Status: COMPLETED | OUTPATIENT
Start: 2025-08-19